# Patient Record
Sex: MALE | Race: WHITE | NOT HISPANIC OR LATINO | Employment: OTHER | URBAN - METROPOLITAN AREA
[De-identification: names, ages, dates, MRNs, and addresses within clinical notes are randomized per-mention and may not be internally consistent; named-entity substitution may affect disease eponyms.]

---

## 2017-07-12 ENCOUNTER — APPOINTMENT (OUTPATIENT)
Dept: GENERAL RADIOLOGY | Facility: CLINIC | Age: 82
End: 2017-07-12
Attending: EMERGENCY MEDICINE
Payer: COMMERCIAL

## 2017-07-12 ENCOUNTER — HOSPITAL ENCOUNTER (EMERGENCY)
Facility: CLINIC | Age: 82
Discharge: HOME | End: 2017-07-12
Attending: EMERGENCY MEDICINE | Admitting: EMERGENCY MEDICINE
Payer: COMMERCIAL

## 2017-07-12 VITALS
HEART RATE: 61 BPM | DIASTOLIC BLOOD PRESSURE: 73 MMHG | TEMPERATURE: 97.6 F | OXYGEN SATURATION: 98 % | RESPIRATION RATE: 20 BRPM | SYSTOLIC BLOOD PRESSURE: 143 MMHG

## 2017-07-12 DIAGNOSIS — I48.20 CHRONIC ATRIAL FIBRILLATION (H): ICD-10-CM

## 2017-07-12 DIAGNOSIS — R42 LIGHTHEADEDNESS: ICD-10-CM

## 2017-07-12 LAB
ANION GAP SERPL CALCULATED.3IONS-SCNC: 6 MMOL/L (ref 3–14)
BUN SERPL-MCNC: 21 MG/DL (ref 7–30)
CALCIUM SERPL-MCNC: 8.8 MG/DL (ref 8.5–10.1)
CHLORIDE SERPL-SCNC: 106 MMOL/L (ref 94–109)
CO2 SERPL-SCNC: 28 MMOL/L (ref 20–32)
CREAT SERPL-MCNC: 1.19 MG/DL (ref 0.66–1.25)
ERYTHROCYTE [DISTWIDTH] IN BLOOD BY AUTOMATED COUNT: 15.9 % (ref 10–15)
GFR SERPL CREATININE-BSD FRML MDRD: 59 ML/MIN/1.7M2
GLUCOSE SERPL-MCNC: 103 MG/DL (ref 70–99)
HCT VFR BLD AUTO: 36.7 % (ref 40–53)
HGB BLD-MCNC: 11.3 G/DL (ref 13.3–17.7)
INTERPRETATION ECG - MUSE: NORMAL
MCH RBC QN AUTO: 27.3 PG (ref 26.5–33)
MCHC RBC AUTO-ENTMCNC: 30.8 G/DL (ref 31.5–36.5)
MCV RBC AUTO: 89 FL (ref 78–100)
NT-PROBNP SERPL-MCNC: 3179 PG/ML (ref 0–1800)
PLATELET # BLD AUTO: 254 10E9/L (ref 150–450)
POTASSIUM SERPL-SCNC: 4.4 MMOL/L (ref 3.4–5.3)
RBC # BLD AUTO: 4.14 10E12/L (ref 4.4–5.9)
SODIUM SERPL-SCNC: 140 MMOL/L (ref 133–144)
TROPONIN I SERPL-MCNC: NORMAL UG/L (ref 0–0.04)
WBC # BLD AUTO: 7.2 10E9/L (ref 4–11)

## 2017-07-12 PROCEDURE — 83880 ASSAY OF NATRIURETIC PEPTIDE: CPT | Performed by: EMERGENCY MEDICINE

## 2017-07-12 PROCEDURE — 93005 ELECTROCARDIOGRAM TRACING: CPT

## 2017-07-12 PROCEDURE — 85027 COMPLETE CBC AUTOMATED: CPT | Performed by: EMERGENCY MEDICINE

## 2017-07-12 PROCEDURE — 80048 BASIC METABOLIC PNL TOTAL CA: CPT | Performed by: EMERGENCY MEDICINE

## 2017-07-12 PROCEDURE — 71020 XR CHEST 2 VW: CPT

## 2017-07-12 PROCEDURE — 84484 ASSAY OF TROPONIN QUANT: CPT | Performed by: EMERGENCY MEDICINE

## 2017-07-12 PROCEDURE — 99285 EMERGENCY DEPT VISIT HI MDM: CPT | Mod: 25

## 2017-07-12 ASSESSMENT — ENCOUNTER SYMPTOMS
SHORTNESS OF BREATH: 1
VOMITING: 0
PALPITATIONS: 1
FEVER: 0
LIGHT-HEADEDNESS: 1
COUGH: 0
DIZZINESS: 0
DIARRHEA: 0
NAUSEA: 0

## 2017-07-12 NOTE — DISCHARGE INSTRUCTIONS
Discharge Instructions for Atrial Fibrillation  You have been diagnosed with an abnormal heart rhythm called atrial fibrillation. With this condition, your heart s 2 upper chambers quiver rather than squeeze the blood out in a normal pattern. This leads to an irregular and sometimes rapid heartbeat. Some people will develop associated symptoms such as a flip-flopping heartbeat, chest pain, lightheadedness, or shortness of breath. Other people may have no symptoms at all. Atrial fibrillation is serious because it affects the heart s ability to fill with blood as it should. Blood clots may form. This increases the risk for stroke. Untreated atrial fibrillation can also lead to heart failure. Atrial fibrillation can be controlled. With treatment, most people with atrial fibrillation lead normal lives.  Treatment options  Recommended treatment for atrial fibrillation depends on your age, symptoms, how long you have had atrial fibrillation, and other factors. You will have a complete evaluation to find out if you have any abnormalities that caused your heart to go into atrial fibrillation. This might be blocked heart arteries or a thyroid problem. Your doctor will assess your particular case and discuss choices with you.  Treatment choices may include:    Treating an underlying disorder that puts you at risk for atrial fibrillation. For example, correcting an abnormal thyroid or electrolyte problem, or treating a blocked heart artery.    Restoring a normal heart rhythm with an electrical shock (cardioversion) or with an antiarrhythmic medicine (chemical cardioversion)    Using medicine to control your heart rate in atrial fibrillation.    Preventing the risk for blood clot and stroke using blood-thinning medicines. Your doctor will tell you what he or she recommends. Choices may include aspirin, clopidogrel, warfarin, dabigatran, rivaroxaban, apixaban, and edoxaban.    Doing catheter ablation or a surgical maze  procedure. These use different methods to destroy certain areas of heart tissue. This interrupts the electrical signals causing atrial fibrillation. One of these procedures may be a choice when medicines do not work, or as an alternative to long-term medicine.    Other treatment choices may be recommended for you by your doctor.  Managing risk factors for stroke and preventing heart failure are important parts of any treatment plan for atrial fibrillation.  Home care    Take your medicines exactly as directed. Don t skip doses.    Work with your doctor to find the right medicines and doses for you.    Learn to take your own pulse. Keep a record of your results. Ask your doctor which pulse rates mean that you need medical attention. Slowing your pulse is often the goal of treatment. Ask your doctor if it s OK for you to use an automatic machine to check your pulse at home. Sometimes these machines don t count the pulse correctly when you have atrial fibrillation.    Limit your intake of coffee, tea, cola, and other beverages with caffeine. Talk with your doctor about whether you should eliminate caffeine.    Avoid over-the-counter medicines that have caffeine in them.    Let your doctor know what medicines you take, including prescription and over-the-counter medicines, as well as any supplements. They interfere with some medicines given for atrial fibrillation.    Ask your doctor about whether you can drink alcohol. Some people need to avoid alcohol to better treat atrial fibrillation. If you are taking blood-thinner medicines, alcohol may interfere with them by increasing their effect.    Never take stimulants such as amphetamines or cocaine. These drugs can speed up your heart rate and trigger atrial fibrillation.  Follow-up care  Follow up with your doctor, or as advised.     When should I call my healthcare provider  Call your healthcare provider right away if you have any of the  following:    Weakness    Dizziness    Fainting    Fatigue    Shortness of breath    Chest pain with increased activity    A change in the usual regularity of your heartbeat, or an unusually fast heartbeat   Date Last Reviewed: 4/23/2016 2000-2017 [employer]. [address] [place] [address], [place] [address]. All rights reserved. This information is not intended as a substitute for professional medical care. Always follow your healthcare professional's instructions.        Possible Causes of Dizziness or Fainting    Dizziness and fainting can have many causes. Below are some examples of possible causes your healthcare provider will look to rule out.  Benign paroxysmal positional vertigo (BPPV)  BPPV results when calcium crystals inside the inner ear shift into the wrong position. BPPV causes episodes of vertigo (a spinning sensation). Episodes most often happen when the head is moved in a certain way. This is more common in people 65 and older.   Infection or inflammation  The semicircular canals of the ear may become infected or inflamed. In this case, they can send the wrong balance signals. This can cause vertigo.  Meniere disease  Meniere disease happens when there is too much fluid in the semicircular canals. This can cause vertigo. It also can cause hearing problems and buzzing or ringing in the ears (called tinnitus). You may also have a feeling of pressure or fullness in the ear.  Syncope  Syncope is fainting that happens when the brain doesn t get enough oxygen-rich blood. It can be caused by low heart rate or low blood pressure. This is called vasovagal syncope. It can also be caused by sitting or standing up too quickly. This is called orthostatic hypotension. Syncope may also be due to a heart valve problem, an abnormal heart rhythm, or other heart problems. Dizziness can also happen from stroke, hemorrhage in the brain, or other problems in the brain. Your healthcare provider may do certain  tests to rule out these conditions.  Other causes  Other causes include:    Medicines. Certain medicines can cause dizziness and even fainting. In some cases, stopping a medicine too quickly can lead to withdrawal symptoms, including dizziness and fainting.    Anxiety. Being anxious can lead to breathing changes, such as hyperventilation. These can lead to dizziness and fainting.  Additional causes for dizziness and fainting also exist. Talk to your healthcare provider for more information.     Date Last Reviewed: 10/6/2015    3337-6813 [employer]. [address] [place] [address], [place] [address]. All rights reserved. This information is not intended as a substitute for professional medical care. Always follow your healthcare professional's instructions.

## 2017-07-12 NOTE — ED AVS SNAPSHOT
[facility] Emergency Department    [address]    [facility] [address]    Phone:  [phone]    Fax:  [phone]                                       Crow Salinas   MRN: [id number]    Department:  [facility] Emergency Department   Date of Visit:  7/12/2017           After Visit Summary Signature Page     I have received my discharge instructions, and my questions have been answered. I have discussed any challenges I see with this plan with the nurse or doctor.    ..........................................................................................................................................  Patient/Patient Representative Signature      ..........................................................................................................................................  Patient Representative Print Name and Relationship to Patient    ..................................................               ................................................  Date                                            Time    ..........................................................................................................................................  Reviewed by Signature/Title    ...................................................              ..............................................  Date                                                            Time

## 2017-07-12 NOTE — ED NOTES
ABC's intact.  Alert and oriented x4.    Pt states he began to have palpiations 1-2 days ago.  Today states he feels a little bit short of breath.  Denies chest pain, back pain, or arm pain.  On eliquis for a-fib.

## 2017-07-12 NOTE — ED AVS SNAPSHOT
[facility] Emergency Department    [address] [place] [address]    Phone:  [phone]    Fax:  [phone]                                       Crow Salinas   MRN: [id number]    Department:  [facility] Emergency Department   Date of Visit:  7/12/2017           Patient Information     Date Of Birth          1935        Your diagnoses for this visit were:     Lightheadedness     Chronic atrial fibrillation (H)        You were seen by Porfirio Larsen MD.      Follow-up Information     Follow up with Cardiology.    Why:  for re-evaluation of your symptoms, next available        Discharge Instructions         Discharge Instructions for Atrial Fibrillation  You have been diagnosed with an abnormal heart rhythm called atrial fibrillation. With this condition, your heart s 2 upper chambers quiver rather than squeeze the blood out in a normal pattern. This leads to an irregular and sometimes rapid heartbeat. Some people will develop associated symptoms such as a flip-flopping heartbeat, chest pain, lightheadedness, or shortness of breath. Other people may have no symptoms at all. Atrial fibrillation is serious because it affects the heart s ability to fill with blood as it should. Blood clots may form. This increases the risk for stroke. Untreated atrial fibrillation can also lead to heart failure. Atrial fibrillation can be controlled. With treatment, most people with atrial fibrillation lead normal lives.  Treatment options  Recommended treatment for atrial fibrillation depends on your age, symptoms, how long you have had atrial fibrillation, and other factors. You will have a complete evaluation to find out if you have any abnormalities that caused your heart to go into atrial fibrillation. This might be blocked heart arteries or a thyroid problem. Your doctor will assess your particular case and discuss choices with you.  Treatment choices may  include:    Treating an underlying disorder that puts you at risk for atrial fibrillation. For example, correcting an abnormal thyroid or electrolyte problem, or treating a blocked heart artery.    Restoring a normal heart rhythm with an electrical shock (cardioversion) or with an antiarrhythmic medicine (chemical cardioversion)    Using medicine to control your heart rate in atrial fibrillation.    Preventing the risk for blood clot and stroke using blood-thinning medicines. Your doctor will tell you what he or she recommends. Choices may include aspirin, clopidogrel, warfarin, dabigatran, rivaroxaban, apixaban, and edoxaban.    Doing catheter ablation or a surgical maze procedure. These use different methods to destroy certain areas of heart tissue. This interrupts the electrical signals causing atrial fibrillation. One of these procedures may be a choice when medicines do not work, or as an alternative to long-term medicine.    Other treatment choices may be recommended for you by your doctor.  Managing risk factors for stroke and preventing heart failure are important parts of any treatment plan for atrial fibrillation.  Home care    Take your medicines exactly as directed. Don t skip doses.    Work with your doctor to find the right medicines and doses for you.    Learn to take your own pulse. Keep a record of your results. Ask your doctor which pulse rates mean that you need medical attention. Slowing your pulse is often the goal of treatment. Ask your doctor if it s OK for you to use an automatic machine to check your pulse at home. Sometimes these machines don t count the pulse correctly when you have atrial fibrillation.    Limit your intake of coffee, tea, cola, and other beverages with caffeine. Talk with your doctor about whether you should eliminate caffeine.    Avoid over-the-counter medicines that have caffeine in them.    Let your doctor know what medicines you take, including prescription and  over-the-counter medicines, as well as any supplements. They interfere with some medicines given for atrial fibrillation.    Ask your doctor about whether you can drink alcohol. Some people need to avoid alcohol to better treat atrial fibrillation. If you are taking blood-thinner medicines, alcohol may interfere with them by increasing their effect.    Never take stimulants such as amphetamines or cocaine. These drugs can speed up your heart rate and trigger atrial fibrillation.  Follow-up care  Follow up with your doctor, or as advised.     When should I call my healthcare provider  Call your healthcare provider right away if you have any of the following:    Weakness    Dizziness    Fainting    Fatigue    Shortness of breath    Chest pain with increased activity    A change in the usual regularity of your heartbeat, or an unusually fast heartbeat   Date Last Reviewed: 4/23/2016 2000-2017 [employer]. [address] [place] [address], [place] [address]. All rights reserved. This information is not intended as a substitute for professional medical care. Always follow your healthcare professional's instructions.        Possible Causes of Dizziness or Fainting    Dizziness and fainting can have many causes. Below are some examples of possible causes your healthcare provider will look to rule out.  Benign paroxysmal positional vertigo (BPPV)  BPPV results when calcium crystals inside the inner ear shift into the wrong position. BPPV causes episodes of vertigo (a spinning sensation). Episodes most often happen when the head is moved in a certain way. This is more common in people 65 and older.   Infection or inflammation  The semicircular canals of the ear may become infected or inflamed. In this case, they can send the wrong balance signals. This can cause vertigo.  Meniere disease  Meniere disease happens when there is too much fluid in the semicircular canals. This can cause vertigo. It also can cause hearing  problems and buzzing or ringing in the ears (called tinnitus). You may also have a feeling of pressure or fullness in the ear.  Syncope  Syncope is fainting that happens when the brain doesn t get enough oxygen-rich blood. It can be caused by low heart rate or low blood pressure. This is called vasovagal syncope. It can also be caused by sitting or standing up too quickly. This is called orthostatic hypotension. Syncope may also be due to a heart valve problem, an abnormal heart rhythm, or other heart problems. Dizziness can also happen from stroke, hemorrhage in the brain, or other problems in the brain. Your healthcare provider may do certain tests to rule out these conditions.  Other causes  Other causes include:    Medicines. Certain medicines can cause dizziness and even fainting. In some cases, stopping a medicine too quickly can lead to withdrawal symptoms, including dizziness and fainting.    Anxiety. Being anxious can lead to breathing changes, such as hyperventilation. These can lead to dizziness and fainting.  Additional causes for dizziness and fainting also exist. Talk to your healthcare provider for more information.     Date Last Reviewed: 10/6/2015    4909-6771 [employer]. [address] [place] [address]. All rights reserved. This information is not intended as a substitute for professional medical care. Always follow your healthcare professional's instructions.          24 Hour Appointment Hotline       To make an appointment at any [facility], call [phone] ([phone]). If you don't have a family doctor or clinic, we will help you find one. [facility] are conveniently located to serve the needs of you and your family.             Review of your medicines      Notice     You have not been prescribed any medications.            Procedures and tests performed during your visit     BNP    Basic metabolic panel (BMP)    CBC (platelets, no diff)    EKG 12  lead    Troponin I (now)    XR Chest 2 Views      Orders Needing Specimen Collection     None      Pending Results     Date and Time Order Name Status Description    7/12/2017 1429 XR Chest 2 Views Preliminary             Pending Culture Results     No orders found from 7/10/2017 to 7/13/2017.            Pending Results Instructions     If you had any lab results that were not finalized at the time of your Discharge, you can call the ED Lab Result RN at [phone]. You will be contacted by this team for any positive Lab results or changes in treatment. The nurses are available 7 days a week from 10A to 6:30P.  You can leave a message 24 hours per day and they will return your call.        Test Results From Your Hospital Stay        7/12/2017  3:23 PM      Component Results     Component Value Ref Range & Units Status    Troponin I ES  0.000 - 0.045 ug/L Final    <0.015  The 99th percentile for upper reference range is 0.045 ug/L.  Troponin values in   the range of 0.045 - 0.120 ug/L may be associated with risks of adverse   clinical events.           7/12/2017  3:23 PM      Component Results     Component Value Ref Range & Units Status    Sodium 140 133 - 144 mmol/L Final    Potassium 4.4 3.4 - 5.3 mmol/L Final    Chloride 106 94 - 109 mmol/L Final    Carbon Dioxide 28 20 - 32 mmol/L Final    Anion Gap 6 3 - 14 mmol/L Final    Glucose 103 (H) 70 - 99 mg/dL Final    Urea Nitrogen 21 7 - 30 mg/dL Final    Creatinine 1.19 0.66 - 1.25 mg/dL Final    GFR Estimate 59 (L) >60 mL/min/1.7m2 Final    Non  GFR Calc    GFR Estimate If Black 71 >60 mL/min/1.7m2 Final    African American GFR Calc    Calcium 8.8 8.5 - 10.1 mg/dL Final         7/12/2017  3:02 PM      Component Results     Component Value Ref Range & Units Status    WBC 7.2 4.0 - 11.0 10e9/L Final    RBC Count 4.14 (L) 4.4 - 5.9 10e12/L Final    Hemoglobin 11.3 (L) 13.3 - 17.7 g/dL Final    Hematocrit 36.7 (L) 40.0 - 53.0 % Final    MCV 89 78 - 100  fl Final    MCH 27.3 26.5 - 33.0 pg Final    MCHC 30.8 (L) 31.5 - 36.5 g/dL Final    RDW 15.9 (H) 10.0 - 15.0 % Final    Platelet Count 254 150 - 450 10e9/L Final         7/12/2017  3:23 PM      Component Results     Component Value Ref Range & Units Status    N-Terminal Pro BNP Inpatient 3179 (H) 0 - 1800 pg/mL Final    Reference range shown and results flagged as abnormal are suggested inpatient   cut points for confirming diagnosis if CHF in an acute setting. Establishing   a   baseline value for each individual patient is useful for follow-up. An   inpatient or emergency department NT-proPBNP <300 pg/mL effectively rules out   acute CHF, with 99% negative predictive value.  The outpatient non-acute reference range for ruling out CHF is:   0-125 pg/mL (age 18 to less than 75)   0-450 pg/mL (age 75 yrs and older)           7/12/2017  5:06 PM      Narrative     CHEST TWO VIEW   7/12/2017 5:02 PM     HISTORY: Dyspnea.    COMPARISON: None.        Impression     IMPRESSION: Cardiac silhouette within normal limits. No pleural  effusion. No pneumothorax identified. No focal airspace opacities.  Degenerative changes in the spine. Degenerative changes of the right  glenohumeral joint.                Clinical Quality Measure: Blood Pressure Screening     Your blood pressure was checked while you were in the emergency department today. The last reading we obtained was  BP: (!) 143/93 . Please read the guidelines below about what these numbers mean and what you should do about them.  If your systolic blood pressure (the top number) is less than 120 and your diastolic blood pressure (the bottom number) is less than 80, then your blood pressure is normal. There is nothing more that you need to do about it.  If your systolic blood pressure (the top number) is 120-139 or your diastolic blood pressure (the bottom number) is 80-89, your blood pressure may be higher than it should be. You should have your blood pressure rechecked  "within a year by a primary care provider.  If your systolic blood pressure (the top number) is 140 or greater or your diastolic blood pressure (the bottom number) is 90 or greater, you may have high blood pressure. High blood pressure is treatable, but if left untreated over time it can put you at risk for heart attack, stroke, or kidney failure. You should have your blood pressure rechecked by a primary care provider within the next 4 weeks.  If your provider in the emergency department today gave you specific instructions to follow-up with your doctor or provider even sooner than that, you should follow that instruction and not wait for up to 4 weeks for your follow-up visit.        Thank you for choosing [facility]       Thank you for choosing [facility] for your care. Our goal is always to provide you with excellent care. Hearing back from our patients is one way we can continue to improve our services. Please take a few minutes to complete the written survey that you may receive in the mail after you visit with us. Thank you!        [employer] Information     [employer] lets you send messages to your doctor, view your test results, renew your prescriptions, schedule appointments and more. To sign up, go to [email or web address] . Click on \"Log in\" on the left side of the screen, which will take you to the Welcome page. Then click on \"Sign up Now\" on the right side of the page.     You will be asked to enter the access code listed below, as well as some personal information. Please follow the directions to create your username and password.     Your access code is: [email or web address]  Expires: 10/10/2017  5:24 PM     Your access code will  in 90 days. If you need help or a new code, please call your [facility] clinic or [phone].        Care EveryWhere ID     This is your Care EveryWhere ID. This could be used by other organizations to access your [facility] medical records  [id number]        Equal Access to Services  "    ASHKAN CROCKER : Hadii chadd Bryant, wajarrettda luqadaha, qaybta kaalmanatalie ramey, nic alcazar. So [facility] [phone].    ATENCIÓN: Si habla español, tiene a valentine disposición servicios gratuitos de asistencia lingüística. Llame al [phone].    We comply with applicable federal civil rights laws and [place] laws. We do not discriminate on the basis of race, color, national origin, age, disability sex, sexual orientation or gender identity.            After Visit Summary       This is your record. Keep this with you and show to your community pharmacist(s) and doctor(s) at your next visit.

## 2017-07-12 NOTE — ED PROVIDER NOTES
History     Chief Complaint:  Shortness of breath and palpations    HPI   Crow Salinas is a 81 year old male, with a history of atrial fibrillation and taking Eliquis, who presents intermittent lightheadedness and heart palpations. He has chronic atrial fibrillation and has been compliant with his medical regimen, which includes eliquis and lopressor.  He has not checked his pulse, but does not believe it to have been over 100 bpm, and he denies experiencing any chest pain, leg swelling, orthopnea, or PND.     Allergies:  No Known Drug Allergies     Medications:    Eliquis  Lopressor    Past Medical History:    Atrial fibrillation    Past Surgical History:    History reviewed. No pertinent past surgical history.     Family History:    The patient denies any relevant family medical history.     Social History:  Marital Status:   [2]    Review of Systems   Constitutional: Negative for fever.   Respiratory: Positive for shortness of breath. Negative for cough.    Cardiovascular: Positive for palpitations. Negative for chest pain and leg swelling.   Gastrointestinal: Negative for diarrhea, nausea and vomiting.   Neurological: Positive for light-headedness. Negative for dizziness.   All other systems reviewed and are negative.    Physical Exam   Vitals:  Patient Vitals for the past 24 hrs:   BP Temp Temp src Pulse Heart Rate Resp SpO2   07/12/17 1725 143/73 - - - - - -   07/12/17 1715 - - - - - - 98 %   07/12/17 1710 151/78 - - - - - 99 %   07/12/17 1510 (!) 143/93 - - - 66 - 97 %   07/12/17 1505 - - - - 67 - 100 %   07/12/17 1500 - - - - 69 - 92 %   07/12/17 1455 127/83 - - - 69 - 100 %   07/12/17 1450 - - - - 59 - 100 %   07/12/17 1445 - - - - 71 - 100 %   07/12/17 1440 134/70 - - - 75 - 93 %   07/12/17 1435 - - - - 70 - 100 %   07/12/17 1430 - - - - 67 - 98 %   07/12/17 1425 131/80 - - - 61 - 100 %   07/12/17 1323 152/89 97.6  F (36.4  C) Oral 61 - 20 98 %      Physical Exam  General: Patient is alert and  cooperative.  HENT: No nasal congestion or drainage.   Eyes: EOMI, PERRLA.  Normal conjunctiva.  Neck: Normal range of motion. Neck supple.  Cardiovascular: Normal rate, irregular rhythm and normal heart sounds.   Pulmonary/Chest: Effort normal.  No wheezing or crackles.  Abdominal: Soft. Patient exhibits no distension and no mass. There is no tenderness.     Musculoskeletal: Normal range of motion. No lower leg edema or calf asymmetry.   Neurological: Patient  is alert and oriented. Coordination, strength, sensation normal.   Skin: Skin is warm and dry. No rash noted.   Psychiatric: Patient has a normal mood and affect. Normal behavior and judgement.    Emergency Department Course   ECG:  ECG taken at 1319, ECG read at 1405  Atrial fibrillation. Abnormal ECG  Rate 73 bpm. GA interval *. QRS duration 86. QT/QTc 348/438. P-R-T axes *, 42, 46.     Imaging:  Radiology findings were communicated with the patient who voiced understanding of the findings.  Chest XR 2 views  IMPRESSION: Cardiac silhouette within normal limits. No pleural  effusion. No pneumothorax identified. No focal airspace opacities.  Degenerative changes in the spine. Degenerative changes of the right  glenohumeral joint.  Reading per radiology.     Laboratory:  Laboratory findings were communicated with the patient who voiced understanding of the findings.  Troponin (Collected 1420): < 0.015  BMP: Glucose: 103(H), GFR: 59(L), o/w WNL (Creatinine 1.19)    CBC: RBC: 4.14(L), HGB: 11.3(L), HCT: 36.7(L), CHC: 30.8, RDW: 15.9(H), o/w WNL (WBC 7.2,)   BNP: 3179(H)    Emergency Department Course:  Nursing notes and vitals reviewed.  I performed an exam of the patient as documented above.   IV was inserted and blood was drawn for laboratory testing, results above.   The patient was sent for a XR while in the emergency department, results above.      1621 I rechecked with the patient    I discussed the treatment plan with the patient. They expressed  understanding of this plan and consented to discharge. They will be discharged home with instructions for care and follow up. In addition, the patient will return to the emergency department if their symptoms persist, worsen, if new symptoms arise or if there is any concern.  All questions were answered.     I personally reviewed the laboratory results with the Patient and answered all related questions prior to discharge.    Impression & Plan      Medical Decision Making:  Crow Salinas is a 81 year old male who presents to the emergency department today with intermittent light headedness and palpitations. He has a history of chronic atrial fibrillation for which he is anticoagulated on eliquis. He has had no associated chest pain or significant dyspnea. On arrival here he is very slightly hypertensive but has a normal heart rate, an unremarkable physical examination. Testing has included an electrocardiogram depicting a rate controled atrial fibrillation. He has an undetectable troponin, normal renal function, a hemoglobin of 11.3. His BNP is 3179 but he has a chest x ray which is unremarkable. There is no radiographic evidence of congestive heart failure. His ED stay was prolonged due to extraordinarily high volumes causing [facility] to go on divert this afternoon. Throughout his stay he has had oxygen saturation of 98 to 100 %. These findings were discussed with Crow. Etiology for his light headedness is uncertain but it appears though as his heart rate is adequately controlled and there is no concern for an ischemic process. His elevated BNP was discussed with him but given his normal oxygen saturations and X ray I don't believe any emergency intervention is neccessary at this time. I did talk with him about the possibility of benefiting from diuretics but suggested that he contact his clinic and or cardiologist for follow up next available for reevaluation and to discs's adding this to his medicinal care.  Diagnosis is light headedness, chronic atrial fibrillation, rate control, slightly elevated BNP with a normal physical exam.    Diagnosis:    ICD-10-CM    1. Lightheadedness R42    2. Chronic atrial fibrillation (H) I48.2         Disposition:   Discharged    Scribe Disclosure:  PRADEEP, Robert Wright, am serving as a scribe at 3:03 PM on 7/12/2017 to document services personally performed by Porfirio Larsen MD, based on my observations and the provider's statements to me.   [facility] EMERGENCY DEPARTMENT       Porfirio Larsen MD  07/13/17 1339

## 2019-04-04 ENCOUNTER — TRANSFERRED RECORDS (OUTPATIENT)
Dept: HEALTH INFORMATION MANAGEMENT | Facility: CLINIC | Age: 84
End: 2019-04-04

## 2019-04-15 ENCOUNTER — HOSPITAL ENCOUNTER (OUTPATIENT)
Dept: CARDIAC REHAB | Facility: CLINIC | Age: 84
End: 2019-04-15
Attending: INTERNAL MEDICINE
Payer: COMMERCIAL

## 2019-04-15 PROCEDURE — G0424 PULMONARY REHAB W EXER: HCPCS

## 2019-04-15 PROCEDURE — 40000244 ZZH STATISTIC VISIT PULM REHAB

## 2019-04-29 ENCOUNTER — HOSPITAL ENCOUNTER (OUTPATIENT)
Dept: CARDIAC REHAB | Facility: CLINIC | Age: 84
End: 2019-04-29
Attending: INTERNAL MEDICINE
Payer: COMMERCIAL

## 2019-04-29 PROCEDURE — 93798 PHYS/QHP OP CAR RHAB W/ECG: CPT

## 2019-04-29 PROCEDURE — 40000116 ZZH STATISTIC OP CR VISIT

## 2019-05-02 ENCOUNTER — HOSPITAL ENCOUNTER (OUTPATIENT)
Dept: CARDIAC REHAB | Facility: CLINIC | Age: 84
End: 2019-05-02
Attending: INTERNAL MEDICINE
Payer: COMMERCIAL

## 2019-05-02 PROCEDURE — G0424 PULMONARY REHAB W EXER: HCPCS

## 2019-05-02 PROCEDURE — 40000244 ZZH STATISTIC VISIT PULM REHAB

## 2019-05-07 ENCOUNTER — HOSPITAL ENCOUNTER (OUTPATIENT)
Dept: CARDIAC REHAB | Facility: CLINIC | Age: 84
End: 2019-05-07
Attending: INTERNAL MEDICINE
Payer: COMMERCIAL

## 2019-05-07 PROCEDURE — G0424 PULMONARY REHAB W EXER: HCPCS

## 2019-05-07 PROCEDURE — 40000244 ZZH STATISTIC VISIT PULM REHAB

## 2019-05-09 ENCOUNTER — HOSPITAL ENCOUNTER (OUTPATIENT)
Dept: CARDIAC REHAB | Facility: CLINIC | Age: 84
End: 2019-05-09
Attending: INTERNAL MEDICINE
Payer: COMMERCIAL

## 2019-05-09 PROCEDURE — 40000244 ZZH STATISTIC VISIT PULM REHAB

## 2019-05-09 PROCEDURE — G0424 PULMONARY REHAB W EXER: HCPCS

## 2019-05-14 ENCOUNTER — HOSPITAL ENCOUNTER (OUTPATIENT)
Dept: CARDIAC REHAB | Facility: CLINIC | Age: 84
End: 2019-05-14
Attending: INTERNAL MEDICINE
Payer: COMMERCIAL

## 2019-05-14 PROCEDURE — G0424 PULMONARY REHAB W EXER: HCPCS

## 2019-05-14 PROCEDURE — 40000244 ZZH STATISTIC VISIT PULM REHAB

## 2019-05-16 ENCOUNTER — HOSPITAL ENCOUNTER (OUTPATIENT)
Dept: CARDIAC REHAB | Facility: CLINIC | Age: 84
End: 2019-05-16
Attending: INTERNAL MEDICINE
Payer: COMMERCIAL

## 2019-05-16 PROCEDURE — 40000244 ZZH STATISTIC VISIT PULM REHAB

## 2019-05-16 PROCEDURE — G0424 PULMONARY REHAB W EXER: HCPCS

## 2019-05-21 ENCOUNTER — HOSPITAL ENCOUNTER (OUTPATIENT)
Dept: CARDIAC REHAB | Facility: CLINIC | Age: 84
End: 2019-05-21
Attending: INTERNAL MEDICINE
Payer: COMMERCIAL

## 2019-05-21 PROCEDURE — G0424 PULMONARY REHAB W EXER: HCPCS

## 2019-05-21 PROCEDURE — 40000244 ZZH STATISTIC VISIT PULM REHAB

## 2019-05-23 ENCOUNTER — HOSPITAL ENCOUNTER (OUTPATIENT)
Dept: CARDIAC REHAB | Facility: CLINIC | Age: 84
End: 2019-05-23
Attending: INTERNAL MEDICINE
Payer: COMMERCIAL

## 2019-05-23 PROCEDURE — 40000244 ZZH STATISTIC VISIT PULM REHAB

## 2019-05-23 PROCEDURE — G0424 PULMONARY REHAB W EXER: HCPCS

## 2019-05-28 ENCOUNTER — HOSPITAL ENCOUNTER (OUTPATIENT)
Dept: CARDIAC REHAB | Facility: CLINIC | Age: 84
End: 2019-05-28
Attending: INTERNAL MEDICINE
Payer: COMMERCIAL

## 2019-05-28 PROCEDURE — G0424 PULMONARY REHAB W EXER: HCPCS

## 2019-05-28 PROCEDURE — 40000244 ZZH STATISTIC VISIT PULM REHAB

## 2019-06-18 ENCOUNTER — HOSPITAL ENCOUNTER (OUTPATIENT)
Dept: CARDIAC REHAB | Facility: CLINIC | Age: 84
End: 2019-06-18
Attending: INTERNAL MEDICINE
Payer: COMMERCIAL

## 2019-06-18 PROCEDURE — G0424 PULMONARY REHAB W EXER: HCPCS

## 2019-06-18 PROCEDURE — 40000244 ZZH STATISTIC VISIT PULM REHAB

## 2019-06-20 ENCOUNTER — HOSPITAL ENCOUNTER (OUTPATIENT)
Dept: CARDIAC REHAB | Facility: CLINIC | Age: 84
End: 2019-06-20
Attending: INTERNAL MEDICINE
Payer: COMMERCIAL

## 2019-06-20 PROCEDURE — G0424 PULMONARY REHAB W EXER: HCPCS

## 2019-06-20 PROCEDURE — 40000244 ZZH STATISTIC VISIT PULM REHAB

## 2019-06-25 ENCOUNTER — HOSPITAL ENCOUNTER (OUTPATIENT)
Dept: CARDIAC REHAB | Facility: CLINIC | Age: 84
End: 2019-06-25
Attending: INTERNAL MEDICINE
Payer: COMMERCIAL

## 2019-06-25 PROCEDURE — G0424 PULMONARY REHAB W EXER: HCPCS

## 2019-06-25 PROCEDURE — 40000244 ZZH STATISTIC VISIT PULM REHAB

## 2019-06-27 ENCOUNTER — HOSPITAL ENCOUNTER (OUTPATIENT)
Dept: CARDIAC REHAB | Facility: CLINIC | Age: 84
End: 2019-06-27
Attending: INTERNAL MEDICINE
Payer: COMMERCIAL

## 2019-06-27 PROCEDURE — G0424 PULMONARY REHAB W EXER: HCPCS

## 2019-06-27 PROCEDURE — 40000244 ZZH STATISTIC VISIT PULM REHAB

## 2019-07-02 ENCOUNTER — HOSPITAL ENCOUNTER (OUTPATIENT)
Dept: CARDIAC REHAB | Facility: CLINIC | Age: 84
End: 2019-07-02
Attending: INTERNAL MEDICINE
Payer: COMMERCIAL

## 2019-07-02 PROCEDURE — 40000244 ZZH STATISTIC VISIT PULM REHAB

## 2019-07-02 PROCEDURE — G0239 OTH RESP PROC, GROUP: HCPCS

## 2019-07-09 ENCOUNTER — HOSPITAL ENCOUNTER (OUTPATIENT)
Dept: CARDIAC REHAB | Facility: CLINIC | Age: 84
End: 2019-07-09
Attending: INTERNAL MEDICINE
Payer: COMMERCIAL

## 2019-07-09 PROCEDURE — 40000244 ZZH STATISTIC VISIT PULM REHAB

## 2019-07-09 PROCEDURE — G0424 PULMONARY REHAB W EXER: HCPCS

## 2019-07-11 ENCOUNTER — HOSPITAL ENCOUNTER (OUTPATIENT)
Dept: CARDIAC REHAB | Facility: CLINIC | Age: 84
End: 2019-07-11
Attending: INTERNAL MEDICINE
Payer: COMMERCIAL

## 2019-07-11 PROCEDURE — G0424 PULMONARY REHAB W EXER: HCPCS

## 2019-07-11 PROCEDURE — 40000244 ZZH STATISTIC VISIT PULM REHAB

## 2019-07-18 ENCOUNTER — HOSPITAL ENCOUNTER (OUTPATIENT)
Dept: CARDIAC REHAB | Facility: CLINIC | Age: 84
End: 2019-07-18
Attending: INTERNAL MEDICINE
Payer: COMMERCIAL

## 2019-07-18 PROCEDURE — 40000244 ZZH STATISTIC VISIT PULM REHAB

## 2019-07-18 PROCEDURE — G0424 PULMONARY REHAB W EXER: HCPCS

## 2019-07-23 ENCOUNTER — HOSPITAL ENCOUNTER (OUTPATIENT)
Dept: CARDIAC REHAB | Facility: CLINIC | Age: 84
End: 2019-07-23
Attending: INTERNAL MEDICINE
Payer: COMMERCIAL

## 2019-07-23 PROCEDURE — 40000244 ZZH STATISTIC VISIT PULM REHAB

## 2019-07-23 PROCEDURE — G0424 PULMONARY REHAB W EXER: HCPCS

## 2019-07-25 ENCOUNTER — HOSPITAL ENCOUNTER (OUTPATIENT)
Dept: CARDIAC REHAB | Facility: CLINIC | Age: 84
End: 2019-07-25
Attending: INTERNAL MEDICINE
Payer: COMMERCIAL

## 2019-07-25 PROCEDURE — G0424 PULMONARY REHAB W EXER: HCPCS

## 2019-07-25 PROCEDURE — 40000244 ZZH STATISTIC VISIT PULM REHAB

## 2022-06-16 ENCOUNTER — APPOINTMENT (OUTPATIENT)
Dept: GENERAL RADIOLOGY | Facility: CLINIC | Age: 87
End: 2022-06-16
Attending: EMERGENCY MEDICINE
Payer: COMMERCIAL

## 2022-06-16 ENCOUNTER — HOSPITAL ENCOUNTER (EMERGENCY)
Facility: CLINIC | Age: 87
Discharge: HOME | End: 2022-06-16
Attending: EMERGENCY MEDICINE | Admitting: EMERGENCY MEDICINE
Payer: COMMERCIAL

## 2022-06-16 VITALS
OXYGEN SATURATION: 93 % | DIASTOLIC BLOOD PRESSURE: 87 MMHG | HEART RATE: 62 BPM | HEIGHT: 71 IN | TEMPERATURE: 97.1 F | RESPIRATION RATE: 18 BRPM | SYSTOLIC BLOOD PRESSURE: 145 MMHG | BODY MASS INDEX: 25.9 KG/M2 | WEIGHT: 185 LBS

## 2022-06-16 DIAGNOSIS — J44.1 COPD EXACERBATION (H): ICD-10-CM

## 2022-06-16 DIAGNOSIS — R06.02 SHORTNESS OF BREATH: ICD-10-CM

## 2022-06-16 LAB
ANION GAP SERPL CALCULATED.3IONS-SCNC: 9 MMOL/L (ref 3–14)
ATRIAL RATE - MUSE: 53 BPM
BASOPHILS # BLD AUTO: 0 10E3/UL (ref 0–0.2)
BASOPHILS NFR BLD AUTO: 0 %
BUN SERPL-MCNC: 19 MG/DL (ref 7–30)
CALCIUM SERPL-MCNC: 9.1 MG/DL (ref 8.5–10.1)
CHLORIDE BLD-SCNC: 107 MMOL/L (ref 94–109)
CO2 SERPL-SCNC: 26 MMOL/L (ref 20–32)
CREAT SERPL-MCNC: 1.01 MG/DL (ref 0.66–1.25)
DIASTOLIC BLOOD PRESSURE - MUSE: NORMAL MMHG
EOSINOPHIL # BLD AUTO: 0.1 10E3/UL (ref 0–0.7)
EOSINOPHIL NFR BLD AUTO: 2 %
ERYTHROCYTE [DISTWIDTH] IN BLOOD BY AUTOMATED COUNT: 16.1 % (ref 10–15)
FLUAV RNA SPEC QL NAA+PROBE: NEGATIVE
FLUBV RNA RESP QL NAA+PROBE: NEGATIVE
GFR SERPL CREATININE-BSD FRML MDRD: 72 ML/MIN/1.73M2
GLUCOSE BLD-MCNC: 154 MG/DL (ref 70–99)
HCT VFR BLD AUTO: 44 % (ref 40–53)
HGB BLD-MCNC: 14 G/DL (ref 13.3–17.7)
HOLD SPECIMEN: NORMAL
IMM GRANULOCYTES # BLD: 0 10E3/UL
IMM GRANULOCYTES NFR BLD: 0 %
INTERPRETATION ECG - MUSE: NORMAL
LYMPHOCYTES # BLD AUTO: 1.2 10E3/UL (ref 0.8–5.3)
LYMPHOCYTES NFR BLD AUTO: 17 %
MCH RBC QN AUTO: 30.5 PG (ref 26.5–33)
MCHC RBC AUTO-ENTMCNC: 31.8 G/DL (ref 31.5–36.5)
MCV RBC AUTO: 96 FL (ref 78–100)
MONOCYTES # BLD AUTO: 1.1 10E3/UL (ref 0–1.3)
MONOCYTES NFR BLD AUTO: 16 %
NEUTROPHILS # BLD AUTO: 4.3 10E3/UL (ref 1.6–8.3)
NEUTROPHILS NFR BLD AUTO: 65 %
NRBC # BLD AUTO: 0 10E3/UL
NRBC BLD AUTO-RTO: 0 /100
NT-PROBNP SERPL-MCNC: 2233 PG/ML (ref 0–1800)
P AXIS - MUSE: NORMAL DEGREES
PLATELET # BLD AUTO: 169 10E3/UL (ref 150–450)
POTASSIUM BLD-SCNC: 4.3 MMOL/L (ref 3.4–5.3)
PR INTERVAL - MUSE: NORMAL MS
QRS DURATION - MUSE: 84 MS
QT - MUSE: 420 MS
QTC - MUSE: 415 MS
R AXIS - MUSE: 43 DEGREES
RBC # BLD AUTO: 4.59 10E6/UL (ref 4.4–5.9)
RSV RNA SPEC NAA+PROBE: NEGATIVE
SARS-COV-2 RNA RESP QL NAA+PROBE: NEGATIVE
SODIUM SERPL-SCNC: 142 MMOL/L (ref 133–144)
SYSTOLIC BLOOD PRESSURE - MUSE: NORMAL MMHG
T AXIS - MUSE: 61 DEGREES
TROPONIN I SERPL HS-MCNC: 32 NG/L
VENTRICULAR RATE- MUSE: 59 BPM
WBC # BLD AUTO: 6.7 10E3/UL (ref 4–11)

## 2022-06-16 PROCEDURE — 80048 BASIC METABOLIC PNL TOTAL CA: CPT | Performed by: EMERGENCY MEDICINE

## 2022-06-16 PROCEDURE — C9803 HOPD COVID-19 SPEC COLLECT: HCPCS

## 2022-06-16 PROCEDURE — 83880 ASSAY OF NATRIURETIC PEPTIDE: CPT | Performed by: EMERGENCY MEDICINE

## 2022-06-16 PROCEDURE — 99285 EMERGENCY DEPT VISIT HI MDM: CPT | Mod: CS,25

## 2022-06-16 PROCEDURE — 85025 COMPLETE CBC W/AUTO DIFF WBC: CPT | Performed by: EMERGENCY MEDICINE

## 2022-06-16 PROCEDURE — 87637 SARSCOV2&INF A&B&RSV AMP PRB: CPT | Performed by: EMERGENCY MEDICINE

## 2022-06-16 PROCEDURE — 84484 ASSAY OF TROPONIN QUANT: CPT | Performed by: EMERGENCY MEDICINE

## 2022-06-16 PROCEDURE — 93005 ELECTROCARDIOGRAM TRACING: CPT | Mod: RTG

## 2022-06-16 PROCEDURE — 250N000009 HC RX 250: Performed by: EMERGENCY MEDICINE

## 2022-06-16 PROCEDURE — 36415 COLL VENOUS BLD VENIPUNCTURE: CPT | Performed by: EMERGENCY MEDICINE

## 2022-06-16 PROCEDURE — 71046 X-RAY EXAM CHEST 2 VIEWS: CPT

## 2022-06-16 PROCEDURE — 94640 AIRWAY INHALATION TREATMENT: CPT

## 2022-06-16 RX ORDER — IPRATROPIUM BROMIDE AND ALBUTEROL SULFATE 2.5; .5 MG/3ML; MG/3ML
3 SOLUTION RESPIRATORY (INHALATION) ONCE
Status: COMPLETED | OUTPATIENT
Start: 2022-06-16 | End: 2022-06-16

## 2022-06-16 RX ADMIN — IPRATROPIUM BROMIDE AND ALBUTEROL SULFATE 3 ML: 2.5; .5 SOLUTION RESPIRATORY (INHALATION) at 12:05

## 2022-06-16 ASSESSMENT — ENCOUNTER SYMPTOMS
DIARRHEA: 0
FEVER: 0
RHINORRHEA: 1
ABDOMINAL PAIN: 1
VOMITING: 0
COUGH: 1
SHORTNESS OF BREATH: 1

## 2022-06-16 NOTE — ED PROVIDER NOTES
History   Chief Complaint:  Shortness of Breath       The history is provided by the patient.      Crow Salinas is a 86 year old male on Eliquis with history of COPD, Atrial fibrillation who presents with shortness of breath. The patient states that the symptoms began a week ago and he contacted his doctor telling them he might have covid and they directed him to the ED. He states that the dyspnea diminishes with exertion and is most present when he is laying down. He affirms a cough and rhinorrhea. He denies chest pain, hemoptysis, leg swelling, vomiting, diarrhea, or fever. He mentions abdominal pain that has been present for years and unchanged today.  He did have a COVID exposure 10 days ago, but patient is fully vaccinated and has had both boosters.      Review of Systems   Constitutional: Negative for fever.   HENT: Positive for rhinorrhea.    Respiratory: Positive for cough and shortness of breath.    Cardiovascular: Negative for leg swelling.   Gastrointestinal: Positive for abdominal pain. Negative for diarrhea and vomiting.   All other systems reviewed and are negative.        Allergies:  Aspirin  Etodolac    Medications:  Eliquis  Lipitor   Percocet   Glucotorol  Lexapro  Lopressor   Miralax   Prilosec   Cozaar   Flexeril   Norvasc       Past Medical History:     Depression   Small bowl arteriovenous malformation   Abdominal aortic aneurysm without rupture   Type 2 diabetes   Chronic diastolic heart failure  Moderate pulmonary arterial systolic hypertension   BPH  COPD  Iron deficiency with anemia  Lumbar disc disorder with myelopathy   Chronic pain disorder  Chronic narcotic use  GERD  CKD  Erectile dysfunction   Hyperlipidemia   Hypertension   Chronic atrial fibrillation   Long term use of anticoagulants  Thrombocytopenia    Sepsis with hypotension   Sepsis with UTI  MITZI  chronic low back pain with sciatica   History of tobacco use  Primary osteoarthritis of right hip  Hypercholesterolemia   Bladder  "cancer    Past Surgical History:    Total hip replacement   Upper gastrointestinal endoscopy     Family History:    Mother: bladder cancer     Social History:  Patient came from home.  Patient is unaccompanied in the ED.  Patient affirms tobacco use.    Physical Exam     Patient Vitals for the past 24 hrs:   BP Temp Temp src Pulse Resp SpO2 Height Weight   06/16/22 1330 (!) 145/87 -- -- 62 -- 93 % -- --   06/16/22 1315 (!) 152/121 -- -- 70 -- 94 % -- --   06/16/22 1300 (!) 146/85 -- -- 57 -- 96 % -- --   06/16/22 1230 -- -- -- 58 -- 93 % -- --   06/16/22 1215 -- -- -- 53 -- 92 % -- --   06/16/22 1200 -- -- -- 65 -- 96 % -- --   06/16/22 1129 (!) 158/85 97.1  F (36.2  C) Temporal 60 18 96 % 1.803 m (5' 11\") 83.9 kg (185 lb)       Physical Exam  Vitals and nursing note reviewed.   Constitutional:       General: He is not in acute distress.     Appearance: Normal appearance. He is well-developed. He is not ill-appearing.   HENT:      Head: Normocephalic and atraumatic.      Right Ear: External ear normal.      Left Ear: External ear normal.      Nose: Nose normal.   Eyes:      Conjunctiva/sclera: Conjunctivae normal.   Cardiovascular:      Rate and Rhythm: Bradycardia present. Rhythm irregularly irregular.      Heart sounds: No murmur heard.  Pulmonary:      Effort: Pulmonary effort is normal. No respiratory distress.      Breath sounds: Decreased breath sounds present. No wheezing, rhonchi or rales.   Abdominal:      General: Abdomen is flat. There is no distension.      Palpations: Abdomen is soft.      Tenderness: There is no abdominal tenderness. There is no guarding or rebound.   Musculoskeletal:         General: No swelling or deformity.      Cervical back: Normal range of motion and neck supple.      Right lower leg: No edema.      Left lower leg: No edema.   Skin:     General: Skin is warm and dry.      Findings: No rash.   Neurological:      Mental Status: He is alert and oriented to person, place, and time. "   Psychiatric:         Behavior: Behavior normal.         Emergency Department Course   ECG  ECG results from 06/16/22   EKG 12-lead, tracing only     Value    Systolic Blood Pressure     Diastolic Blood Pressure     Ventricular Rate 59    Atrial Rate 53    MD Interval     QRS Duration 84        QTc 415    P Axis     R AXIS 43    T Axis 61    Interpretation ECG      Atrial fibrillation with slow ventricular response  Abnormal ECG  When compared with ECG of 12-JUL-2017 13:19,  No significant change was found  Confirmed by - EMERGENCY ROOM, PHYSICIAN ([id number]),  JYOTHI LIRIANO ([id number]) on 6/16/2022 12:24:40 PM         Imaging:  XR Chest 2 Views   Final Result   IMPRESSION: Stable cardiomediastinal silhouette. No focal airspace   disease, pleural effusion or pneumothorax. No acute bony abnormality.      TAMIKA BOSE MD            SYSTEM ID:  [id number]        Report per radiology    Laboratory:  Labs Ordered and Resulted from Time of ED Arrival to Time of ED Departure   BASIC METABOLIC PANEL - Abnormal       Result Value    Sodium 142      Potassium 4.3      Chloride 107      Carbon Dioxide (CO2) 26      Anion Gap 9      Urea Nitrogen 19      Creatinine 1.01      Calcium 9.1      Glucose 154 (*)     GFR Estimate 72     NT PROBNP INPATIENT - Abnormal    N terminal Pro BNP Inpatient 2,233 (*)    CBC WITH PLATELETS AND DIFFERENTIAL - Abnormal    WBC Count 6.7      RBC Count 4.59      Hemoglobin 14.0      Hematocrit 44.0      MCV 96      MCH 30.5      MCHC 31.8      RDW 16.1 (*)     Platelet Count 169      % Neutrophils 65      % Lymphocytes 17      % Monocytes 16      % Eosinophils 2      % Basophils 0      % Immature Granulocytes 0      NRBCs per 100 WBC 0      Absolute Neutrophils 4.3      Absolute Lymphocytes 1.2      Absolute Monocytes 1.1      Absolute Eosinophils 0.1      Absolute Basophils 0.0      Absolute Immature Granulocytes 0.0      Absolute NRBCs 0.0     TROPONIN I - Normal    Troponin I High  Sensitivity 32     INFLUENZA A/B & SARS-COV2 PCR MULTIPLEX - Normal    Influenza A PCR Negative      Influenza B PCR Negative      RSV PCR Negative      SARS CoV2 PCR Negative          Emergency Department Course:       Reviewed:  I reviewed nursing notes, vitals, past medical history and Care Everywhere    Assessments:  1138 I obtained history and examined the patient as noted above.   1357 I rechecked the patient and explained findings.       Interventions:  1205 Duoneb 3 mL Nebulization     Disposition:  The patient was discharged to home.     Impression & Plan     CMS Diagnoses: None    Medical Decision Makin yo M with SOB for past week.  Suspect COPD exacerbation.  There is no signs of fluid overload or decompensated heart failure.  No fever or significant cough to suggest pneumonia.  His COVID is negative.  His troponin is negative and EKG nonischemic despite awake his symptoms.  Also her symptoms are nonexertional so have low suspicion for cardiac etiology.  Doubt PE given that he is already anticoagulated on Eliquis.  His vital signs are normal he is in no distress.  Recommend close follow-up with his primary care doctor and we discussed return precautions.      Diagnosis:    ICD-10-CM    1. Shortness of breath  R06.02    2. COPD exacerbation (H)  J44.1        Discharge Medications:  There are no discharge medications for this patient.      Scribe Disclosure:  I, Chas Osorio, am serving as a scribe at 11:32 AM on 2022 to document services personally performed by Zeus Valladares MD based on my observations and the provider's statements to me.            Zeus Valladares MD  22 1632

## 2022-06-16 NOTE — ED TRIAGE NOTES
1 week of SOB. Slight cough. Hx COPD. ABCs intact. A&OX4. States walking makes SOB better.      Triage Assessment     Row Name 06/16/22 1128       Triage Assessment (Adult)    Airway WDL WDL       Respiratory WDL    Respiratory WDL X;rhythm/pattern    Rhythm/Pattern, Respiratory shortness of breath       Skin Circulation/Temperature WDL    Skin Circulation/Temperature WDL WDL       Cardiac WDL    Cardiac WDL WDL       Peripheral/Neurovascular WDL    Peripheral Neurovascular WDL WDL       Cognitive/Neuro/Behavioral WDL    Cognitive/Neuro/Behavioral WDL WDL

## 2023-09-22 ENCOUNTER — APPOINTMENT (OUTPATIENT)
Dept: GENERAL RADIOLOGY | Facility: CLINIC | Age: 88
End: 2023-09-22
Attending: STUDENT IN AN ORGANIZED HEALTH CARE EDUCATION/TRAINING PROGRAM
Payer: COMMERCIAL

## 2023-09-22 ENCOUNTER — HOSPITAL ENCOUNTER (EMERGENCY)
Facility: CLINIC | Age: 88
Discharge: HOME | End: 2023-09-22
Attending: STUDENT IN AN ORGANIZED HEALTH CARE EDUCATION/TRAINING PROGRAM | Admitting: STUDENT IN AN ORGANIZED HEALTH CARE EDUCATION/TRAINING PROGRAM
Payer: COMMERCIAL

## 2023-09-22 VITALS
SYSTOLIC BLOOD PRESSURE: 115 MMHG | TEMPERATURE: 97.6 F | OXYGEN SATURATION: 96 % | HEART RATE: 72 BPM | RESPIRATION RATE: 20 BRPM | DIASTOLIC BLOOD PRESSURE: 77 MMHG

## 2023-09-22 DIAGNOSIS — R06.02 SOB (SHORTNESS OF BREATH): ICD-10-CM

## 2023-09-22 DIAGNOSIS — E11.9 TYPE 2 DIABETES MELLITUS WITHOUT COMPLICATION, UNSPECIFIED WHETHER LONG TERM INSULIN USE (H): ICD-10-CM

## 2023-09-22 DIAGNOSIS — I50.32 CHRONIC DIASTOLIC HEART FAILURE (H): Chronic | ICD-10-CM

## 2023-09-22 DIAGNOSIS — J44.9 CHRONIC OBSTRUCTIVE PULMONARY DISEASE, UNSPECIFIED COPD TYPE (H): ICD-10-CM

## 2023-09-22 DIAGNOSIS — U07.1 COVID-19: ICD-10-CM

## 2023-09-22 PROBLEM — L71.9 ROSACEA: Status: ACTIVE | Noted: 2023-09-22

## 2023-09-22 PROBLEM — Z96.641 STATUS POST TOTAL REPLACEMENT OF RIGHT HIP: Status: ACTIVE | Noted: 2017-03-23

## 2023-09-22 PROBLEM — F11.90 CHRONIC NARCOTIC USE: Status: ACTIVE | Noted: 2017-03-30

## 2023-09-22 PROBLEM — I71.40 AAA (ABDOMINAL AORTIC ANEURYSM) WITHOUT RUPTURE (H): Status: ACTIVE | Noted: 2019-07-10

## 2023-09-22 PROBLEM — Z85.51 HISTORY OF PRIMARY MALIGNANT NEOPLASM OF URINARY BLADDER: Status: ACTIVE | Noted: 2023-09-22

## 2023-09-22 PROBLEM — F34.1 PERSISTENT DEPRESSIVE DISORDER: Status: ACTIVE | Noted: 2020-12-04

## 2023-09-22 PROBLEM — E11.29 MICROALBUMINURIA DUE TO TYPE 2 DIABETES MELLITUS (H): Status: ACTIVE | Noted: 2023-05-11

## 2023-09-22 PROBLEM — R80.9 MICROALBUMINURIA DUE TO TYPE 2 DIABETES MELLITUS (H): Status: ACTIVE | Noted: 2023-05-11

## 2023-09-22 PROBLEM — D69.6 THROMBOCYTOPENIA (H): Status: ACTIVE | Noted: 2023-09-22

## 2023-09-22 PROBLEM — E34.50: Status: ACTIVE | Noted: 2023-09-22

## 2023-09-22 PROBLEM — K21.9 GERD WITHOUT ESOPHAGITIS: Status: ACTIVE | Noted: 2017-03-23

## 2023-09-22 PROBLEM — K86.81 EXOCRINE PANCREATIC INSUFFICIENCY: Status: ACTIVE | Noted: 2019-09-05

## 2023-09-22 PROBLEM — N18.30 CKD (CHRONIC KIDNEY DISEASE) STAGE 3, GFR 30-59 ML/MIN (H): Status: ACTIVE | Noted: 2017-01-16

## 2023-09-22 PROBLEM — Z79.891 LONG TERM (CURRENT) USE OF OPIATE ANALGESIC: Status: ACTIVE | Noted: 2023-09-22

## 2023-09-22 PROBLEM — J44.1 COPD EXACERBATION (H): Status: ACTIVE | Noted: 2018-12-26

## 2023-09-22 PROBLEM — G89.4 CHRONIC PAIN DISORDER: Status: ACTIVE | Noted: 2017-08-04

## 2023-09-22 PROBLEM — I48.91 ATRIAL FIBRILLATION (H): Status: ACTIVE | Noted: 2023-09-22

## 2023-09-22 PROBLEM — J90 BILATERAL PLEURAL EFFUSION: Status: ACTIVE | Noted: 2018-12-26

## 2023-09-22 PROBLEM — D50.0 IRON DEFICIENCY ANEMIA DUE TO CHRONIC BLOOD LOSS: Status: ACTIVE | Noted: 2017-12-21

## 2023-09-22 PROBLEM — I27.21 MODERATE PULMONARY ARTERIAL SYSTOLIC HYPERTENSION (H): Status: ACTIVE | Noted: 2019-02-01

## 2023-09-22 PROBLEM — M48.061 SPINAL STENOSIS, LUMBAR REGION WITHOUT NEUROGENIC CLAUDICATION: Status: ACTIVE | Noted: 2023-09-22

## 2023-09-22 PROBLEM — M16.10 OSTEOARTHRITIS OF PELVIS: Status: ACTIVE | Noted: 2023-09-22

## 2023-09-22 PROBLEM — H90.3 BILATERAL NEURAL HEARING LOSS: Status: ACTIVE | Noted: 2023-09-22

## 2023-09-22 PROBLEM — N40.0 BENIGN PROSTATIC HYPERPLASIA: Status: ACTIVE | Noted: 2023-09-22

## 2023-09-22 PROBLEM — K55.20 SMALL BOWEL ARTERIOVENOUS MALFORMATION: Status: ACTIVE | Noted: 2019-08-08

## 2023-09-22 PROBLEM — M51.06 LUMBAR DISC DISORDER WITH MYELOPATHY: Status: ACTIVE | Noted: 2017-08-04

## 2023-09-22 PROBLEM — F52.21 MALE ERECTILE DISORDER (CODE): Status: ACTIVE | Noted: 2023-09-22

## 2023-09-22 LAB
ANION GAP SERPL CALCULATED.3IONS-SCNC: 10 MMOL/L (ref 7–15)
BASOPHILS # BLD AUTO: 0 10E3/UL (ref 0–0.2)
BASOPHILS NFR BLD AUTO: 0 %
BUN SERPL-MCNC: 18.3 MG/DL (ref 8–23)
CALCIUM SERPL-MCNC: 8.9 MG/DL (ref 8.8–10.2)
CHLORIDE SERPL-SCNC: 103 MMOL/L (ref 98–107)
CREAT SERPL-MCNC: 1.34 MG/DL (ref 0.67–1.17)
DEPRECATED HCO3 PLAS-SCNC: 26 MMOL/L (ref 22–29)
EGFRCR SERPLBLD CKD-EPI 2021: 51 ML/MIN/1.73M2
EOSINOPHIL # BLD AUTO: 0 10E3/UL (ref 0–0.7)
EOSINOPHIL NFR BLD AUTO: 1 %
ERYTHROCYTE [DISTWIDTH] IN BLOOD BY AUTOMATED COUNT: 16.8 % (ref 10–15)
FLUAV RNA SPEC QL NAA+PROBE: NEGATIVE
FLUBV RNA RESP QL NAA+PROBE: NEGATIVE
GLUCOSE SERPL-MCNC: 124 MG/DL (ref 70–99)
HCT VFR BLD AUTO: 41 % (ref 40–53)
HGB BLD-MCNC: 13.3 G/DL (ref 13.3–17.7)
HOLD SPECIMEN: NORMAL
HOLD SPECIMEN: NORMAL
IMM GRANULOCYTES # BLD: 0 10E3/UL
IMM GRANULOCYTES NFR BLD: 0 %
LYMPHOCYTES # BLD AUTO: 1.6 10E3/UL (ref 0.8–5.3)
LYMPHOCYTES NFR BLD AUTO: 26 %
MCH RBC QN AUTO: 31.4 PG (ref 26.5–33)
MCHC RBC AUTO-ENTMCNC: 32.4 G/DL (ref 31.5–36.5)
MCV RBC AUTO: 97 FL (ref 78–100)
MONOCYTES # BLD AUTO: 0.9 10E3/UL (ref 0–1.3)
MONOCYTES NFR BLD AUTO: 15 %
NEUTROPHILS # BLD AUTO: 3.5 10E3/UL (ref 1.6–8.3)
NEUTROPHILS NFR BLD AUTO: 58 %
NRBC # BLD AUTO: 0 10E3/UL
NRBC BLD AUTO-RTO: 0 /100
PLATELET # BLD AUTO: 129 10E3/UL (ref 150–450)
POTASSIUM SERPL-SCNC: 4.2 MMOL/L (ref 3.4–5.3)
RBC # BLD AUTO: 4.23 10E6/UL (ref 4.4–5.9)
RSV RNA SPEC NAA+PROBE: NEGATIVE
SARS-COV-2 RNA RESP QL NAA+PROBE: POSITIVE
SODIUM SERPL-SCNC: 139 MMOL/L (ref 136–145)
TROPONIN T SERPL HS-MCNC: 50 NG/L
TROPONIN T SERPL HS-MCNC: 58 NG/L
WBC # BLD AUTO: 6.1 10E3/UL (ref 4–11)

## 2023-09-22 PROCEDURE — 85025 COMPLETE CBC W/AUTO DIFF WBC: CPT | Performed by: STUDENT IN AN ORGANIZED HEALTH CARE EDUCATION/TRAINING PROGRAM

## 2023-09-22 PROCEDURE — 80048 BASIC METABOLIC PNL TOTAL CA: CPT | Performed by: STUDENT IN AN ORGANIZED HEALTH CARE EDUCATION/TRAINING PROGRAM

## 2023-09-22 PROCEDURE — 87637 SARSCOV2&INF A&B&RSV AMP PRB: CPT | Performed by: STUDENT IN AN ORGANIZED HEALTH CARE EDUCATION/TRAINING PROGRAM

## 2023-09-22 PROCEDURE — 36415 COLL VENOUS BLD VENIPUNCTURE: CPT | Performed by: STUDENT IN AN ORGANIZED HEALTH CARE EDUCATION/TRAINING PROGRAM

## 2023-09-22 PROCEDURE — 93005 ELECTROCARDIOGRAM TRACING: CPT

## 2023-09-22 PROCEDURE — 71046 X-RAY EXAM CHEST 2 VIEWS: CPT

## 2023-09-22 PROCEDURE — 84484 ASSAY OF TROPONIN QUANT: CPT | Performed by: STUDENT IN AN ORGANIZED HEALTH CARE EDUCATION/TRAINING PROGRAM

## 2023-09-22 PROCEDURE — 99285 EMERGENCY DEPT VISIT HI MDM: CPT | Mod: 25

## 2023-09-22 ASSESSMENT — ACTIVITIES OF DAILY LIVING (ADL)
ADLS_ACUITY_SCORE: 35

## 2023-09-22 NOTE — ED NOTES
Respiratory (Adult)Airway WDL:  (pt comes in with productive cough, sputum is clear to brown at times. intermittent SOB. pt had a + covid test at home. pt complains that he is easily fatigued. pt recently was at a large gathering for a wedding.)

## 2023-09-22 NOTE — ED PROVIDER NOTES
History     Chief Complaint:  Shortness of Breath       HPI   Crow Salinas is a very pleasant 88 year old male presenting with shortness of breath.  Patient has a complex medical history including atrial fibrillation, CAD, CHF, type 2 diabetes.  Presents complaining of cold like symptoms for the last 5 days.  Last night both he and his wife started to feel short of breath.  They both tested positive for COVID at home.  Patient endorses increased shortness of breath with ambulation, better with rest. No fever or chills. No chest pain, new back pain, new abdominal pain.  He was vaccinated against COVID x2.    Independent Historian:   None - Patient Only    Review of External Notes:   None.      Medications:    Glucotrol   Cardura   Lopressor  Protonix   Eliquis   Lipitor  Viagra   Lasix  Losartan   Percocet   Lexapro  Omeprazole   Metformin     Past Medical History:    Atrial fibrillation  Bilateral pleural effusion  COPD   Severe sepsis  Chronic pain syndrome   Chronic narcotic use   GERD   CKD   Diabetes mellitus  Hypertension  Erectile dysfunction  Mild aortic sclerosis  Hypercholesterolemia   AAA  Lumbar disc disorder with myelopathy     Past Surgical History:    Cholecystectomy  Bladder removal  Left knee replacement       Physical Exam   Patient Vitals for the past 24 hrs:   BP Temp Pulse Resp SpO2   09/22/23 1416 115/77 -- 72 -- 96 %   09/22/23 1339 90/56 97.6  F (36.4  C) 66 20 95 %   09/22/23 1336 -- -- 77 -- --        Physical Exam  Vitals and nursing note reviewed.   Constitutional:       General: He is not in acute distress.     Appearance: He is well-developed. He is not ill-appearing.   HENT:      Mouth/Throat:      Mouth: Mucous membranes are moist.      Pharynx: Oropharynx is clear.   Cardiovascular:      Rate and Rhythm: Normal rate and regular rhythm.   Pulmonary:      Effort: Pulmonary effort is normal.      Breath sounds: Normal breath sounds. No decreased breath sounds, wheezing, rhonchi or  rales.   Musculoskeletal:      Right lower leg: No edema.      Left lower leg: No edema.   Neurological:      Mental Status: He is alert and oriented to person, place, and time.           Emergency Department Course   ECG  ECG results from 09/22/23   EKG 12 lead     Value    Systolic Blood Pressure     Diastolic Blood Pressure     Ventricular Rate 75    Atrial Rate 277    OR Interval     QRS Duration 86        QTc 437    P Axis     R AXIS 52    T Axis 48    Interpretation ECG      Atrial fibrillation  Abnormal ECG  When compared with ECG of 16-JUN-2022 11:48,  No significant change was found          Imaging:  Chest XR,  PA & LAT   Final Result   IMPRESSION: No significant interval change. No new focal   consolidation, pleural effusion or pneumothorax. Cardiomediastinal   silhouette is unremarkable.       BUD PENA MD            SYSTEM ID:  [id number]         Report per radiology    Laboratory:  Labs Ordered and Resulted from Time of ED Arrival to Time of ED Departure   BASIC METABOLIC PANEL - Abnormal       Result Value    Sodium 139      Potassium 4.2      Chloride 103      Carbon Dioxide (CO2) 26      Anion Gap 10      Urea Nitrogen 18.3      Creatinine 1.34 (*)     Calcium 8.9      Glucose 124 (*)     GFR Estimate 51 (*)    CBC WITH PLATELETS AND DIFFERENTIAL - Abnormal    WBC Count 6.1      RBC Count 4.23 (*)     Hemoglobin 13.3      Hematocrit 41.0      MCV 97      MCH 31.4      MCHC 32.4      RDW 16.8 (*)     Platelet Count 129 (*)     % Neutrophils 58      % Lymphocytes 26      % Monocytes 15      % Eosinophils 1      % Basophils 0      % Immature Granulocytes 0      NRBCs per 100 WBC 0      Absolute Neutrophils 3.5      Absolute Lymphocytes 1.6      Absolute Monocytes 0.9      Absolute Eosinophils 0.0      Absolute Basophils 0.0      Absolute Immature Granulocytes 0.0      Absolute NRBCs 0.0     INFLUENZA A/B, RSV, & SARS-COV2 PCR - Abnormal    Influenza A PCR Negative      Influenza B PCR  Negative      RSV PCR Negative      SARS CoV2 PCR Positive (*)    TROPONIN T, HIGH SENSITIVITY - Abnormal    Troponin T, High Sensitivity 58 (*)    TROPONIN T, HIGH SENSITIVITY - Abnormal    Troponin T, High Sensitivity 50 (*)         Procedures   None performed      Emergency Department Course & Assessments:     Interventions:  Medications - No data to display     Assessments:  1903 I rechecked and updated the patient. Patient is requesting to be discharged.     Independent Interpretation (X-rays, CTs, rhythm strip):  I independently interpreted the patient's chest x-ray; reassuring against infiltrate.    Consultations/Discussion of Management or Tests:  None    Social Determinants of Health affecting care:   None.      Disposition:  The patient was discharged to home.     Impression & Plan   CMS Diagnoses: None      Medical Decision Making:      Patient with extensive medical history presenting with shortness of breath.  Considered broad differential including COPD exacerbation, CHF exacerbation, acute coronary syndrome, COVID-pneumonia, bacterial pneumonia, among others.  Low suspicion for pulmonary embolism given no hypoxemia, no tachycardia, no signs or symptoms of DVT.  Patient did test positive for COVID.  On ambulation, the patient did not have hypoxemia.  EKG showed atrial fibrillation.  No ST segment elevation or depression or T wave concerning for acute myocardial ischemia.  Troponin was slightly elevated initially but on repeat it was stable.  Did discuss with the patient performing additional testing to evaluate but the patient was very keen to be discharged to see his wife, he was admitted to the hospital for COVID.  Low likelihood of CHF exacerbation or COPD exacerbation with normal lung exam, no hypoxemia.  No evidence of pneumonia on x-ray and no fever, low likelihood of bacterial pneumonia superimposed on COVID.  Did discuss with the patient Conchita.  His primary care physician had mentioned this  to him.  However, as he has had symptoms for more than 5 days at this point, he is not a candidate for Paxlovid.  Findings were discussed.  Additional verbal instructions were provided.  Plan for continued symptomatic management at home. Plan for follow-up with primary care clinician  in 1 week for as needed for reevaluation. I discussed specific warning signs and instructed the patient to return to the ED if there are any concerns. Understanding of instructions was voiced, questions were answered and the patient was discharged.     Critical Care time was 0 minutes for this patient excluding procedures.      Diagnosis:    ICD-10-CM    1. COVID-19  U07.1       2. SOB (shortness of breath)  R06.02       3. Chronic obstructive pulmonary disease, unspecified COPD type (H)  J44.9       4. Chronic diastolic heart failure (H)  I50.32       5. Type 2 diabetes mellitus without complication, unspecified whether long term insulin use (H)  E11.9            Discharge Medications:  There are no discharge medications for this patient.        Malik Dillard MD  09/22/23 [id number]

## 2023-09-22 NOTE — ED TRIAGE NOTES
Pt presents to ED with shortness of breath and cough. States he has had symptoms for about 1 week. Yesterday tested positive for covid. Wife ill with similar symptoms. Also reports weakness and dizziness on standing.

## 2023-09-23 NOTE — DISCHARGE INSTRUCTIONS
Thank you for allowing us to evaluate you today.  Follow up with primary care clinician  in 1 week for reevaluation..  For fever, use acetaminophen (Tylenol) and/or ibuprofen.  Your labs here were reassuring  Please read the guidance provided with your discharge instructions.  Immediately return to the emergency department with any concerns.

## 2023-09-25 LAB
ATRIAL RATE - MUSE: 277 BPM
DIASTOLIC BLOOD PRESSURE - MUSE: NORMAL MMHG
INTERPRETATION ECG - MUSE: NORMAL
P AXIS - MUSE: NORMAL DEGREES
PR INTERVAL - MUSE: NORMAL MS
QRS DURATION - MUSE: 86 MS
QT - MUSE: 392 MS
QTC - MUSE: 437 MS
R AXIS - MUSE: 52 DEGREES
SYSTOLIC BLOOD PRESSURE - MUSE: NORMAL MMHG
T AXIS - MUSE: 48 DEGREES
VENTRICULAR RATE- MUSE: 75 BPM

## 2024-11-01 ENCOUNTER — APPOINTMENT (OUTPATIENT)
Dept: GENERAL RADIOLOGY | Facility: CLINIC | Age: 89
DRG: 291 | End: 2024-11-01
Attending: EMERGENCY MEDICINE
Payer: COMMERCIAL

## 2024-11-01 ENCOUNTER — HOSPITAL ENCOUNTER (INPATIENT)
Facility: CLINIC | Age: 89
LOS: 3 days | Discharge: HOME | DRG: 291 | End: 2024-11-04
Attending: EMERGENCY MEDICINE | Admitting: HOSPITALIST
Payer: COMMERCIAL

## 2024-11-01 DIAGNOSIS — N52.8 OTHER MALE ERECTILE DYSFUNCTION: ICD-10-CM

## 2024-11-01 DIAGNOSIS — J44.1 COPD EXACERBATION (H): ICD-10-CM

## 2024-11-01 DIAGNOSIS — I10 ESSENTIAL HYPERTENSION: Primary | Chronic | ICD-10-CM

## 2024-11-01 DIAGNOSIS — Z96.641 STATUS POST TOTAL REPLACEMENT OF RIGHT HIP: ICD-10-CM

## 2024-11-01 DIAGNOSIS — J15.9 COMMUNITY ACQUIRED BACTERIAL PNEUMONIA: ICD-10-CM

## 2024-11-01 DIAGNOSIS — R79.89 ELEVATED BRAIN NATRIURETIC PEPTIDE (BNP) LEVEL: ICD-10-CM

## 2024-11-01 DIAGNOSIS — J96.01 ACUTE RESPIRATORY FAILURE WITH HYPOXIA (H): ICD-10-CM

## 2024-11-01 LAB
ALBUMIN SERPL BCG-MCNC: 3.9 G/DL (ref 3.5–5.2)
ALP SERPL-CCNC: 106 U/L (ref 40–150)
ALT SERPL W P-5'-P-CCNC: 17 U/L (ref 0–70)
ANION GAP SERPL CALCULATED.3IONS-SCNC: 13 MMOL/L (ref 7–15)
AST SERPL W P-5'-P-CCNC: 27 U/L (ref 0–45)
BASOPHILS # BLD AUTO: 0 10E3/UL (ref 0–0.2)
BASOPHILS NFR BLD AUTO: 0 %
BILIRUB SERPL-MCNC: 0.9 MG/DL
BUN SERPL-MCNC: 15 MG/DL (ref 8–23)
CALCIUM SERPL-MCNC: 9 MG/DL (ref 8.8–10.4)
CHLORIDE SERPL-SCNC: 102 MMOL/L (ref 98–107)
CREAT SERPL-MCNC: 1.19 MG/DL (ref 0.67–1.17)
EGFRCR SERPLBLD CKD-EPI 2021: 58 ML/MIN/1.73M2
EOSINOPHIL # BLD AUTO: 0 10E3/UL (ref 0–0.7)
EOSINOPHIL NFR BLD AUTO: 0 %
ERYTHROCYTE [DISTWIDTH] IN BLOOD BY AUTOMATED COUNT: 16.3 % (ref 10–15)
FLUAV RNA SPEC QL NAA+PROBE: NEGATIVE
FLUBV RNA RESP QL NAA+PROBE: NEGATIVE
GLUCOSE SERPL-MCNC: 113 MG/DL (ref 70–99)
HCO3 SERPL-SCNC: 22 MMOL/L (ref 22–29)
HCT VFR BLD AUTO: 41.8 % (ref 40–53)
HGB BLD-MCNC: 13.7 G/DL (ref 13.3–17.7)
HOLD SPECIMEN: NORMAL
HOLD SPECIMEN: NORMAL
IMM GRANULOCYTES # BLD: 0.1 10E3/UL
IMM GRANULOCYTES NFR BLD: 1 %
LACTATE SERPL-SCNC: 1.5 MMOL/L (ref 0.7–2)
LYMPHOCYTES # BLD AUTO: 0.7 10E3/UL (ref 0.8–5.3)
LYMPHOCYTES NFR BLD AUTO: 7 %
MCH RBC QN AUTO: 31.4 PG (ref 26.5–33)
MCHC RBC AUTO-ENTMCNC: 32.8 G/DL (ref 31.5–36.5)
MCV RBC AUTO: 96 FL (ref 78–100)
MONOCYTES # BLD AUTO: 1.2 10E3/UL (ref 0–1.3)
MONOCYTES NFR BLD AUTO: 13 %
NEUTROPHILS # BLD AUTO: 7.5 10E3/UL (ref 1.6–8.3)
NEUTROPHILS NFR BLD AUTO: 79 %
NRBC # BLD AUTO: 0 10E3/UL
NRBC BLD AUTO-RTO: 0 /100
NT-PROBNP SERPL-MCNC: 4609 PG/ML (ref 0–1800)
PLATELET # BLD AUTO: 135 10E3/UL (ref 150–450)
POTASSIUM SERPL-SCNC: 4.1 MMOL/L (ref 3.4–5.3)
PROT SERPL-MCNC: 7 G/DL (ref 6.4–8.3)
RBC # BLD AUTO: 4.37 10E6/UL (ref 4.4–5.9)
RSV RNA SPEC NAA+PROBE: NEGATIVE
SARS-COV-2 RNA RESP QL NAA+PROBE: NEGATIVE
SODIUM SERPL-SCNC: 137 MMOL/L (ref 135–145)
TROPONIN T SERPL HS-MCNC: 37 NG/L
TROPONIN T SERPL HS-MCNC: 38 NG/L
WBC # BLD AUTO: 9.4 10E3/UL (ref 4–11)

## 2024-11-01 PROCEDURE — 258N000003 HC RX IP 258 OP 636: Performed by: EMERGENCY MEDICINE

## 2024-11-01 PROCEDURE — 83880 ASSAY OF NATRIURETIC PEPTIDE: CPT | Performed by: EMERGENCY MEDICINE

## 2024-11-01 PROCEDURE — 120N000001 HC R&B MED SURG/OB

## 2024-11-01 PROCEDURE — 82435 ASSAY OF BLOOD CHLORIDE: CPT | Performed by: EMERGENCY MEDICINE

## 2024-11-01 PROCEDURE — 84145 PROCALCITONIN (PCT): CPT | Performed by: HOSPITALIST

## 2024-11-01 PROCEDURE — 71046 X-RAY EXAM CHEST 2 VIEWS: CPT

## 2024-11-01 PROCEDURE — 82247 BILIRUBIN TOTAL: CPT | Performed by: EMERGENCY MEDICINE

## 2024-11-01 PROCEDURE — 96375 TX/PRO/DX INJ NEW DRUG ADDON: CPT

## 2024-11-01 PROCEDURE — 250N000013 HC RX MED GY IP 250 OP 250 PS 637: Performed by: EMERGENCY MEDICINE

## 2024-11-01 PROCEDURE — 36415 COLL VENOUS BLD VENIPUNCTURE: CPT | Performed by: EMERGENCY MEDICINE

## 2024-11-01 PROCEDURE — 84155 ASSAY OF PROTEIN SERUM: CPT | Performed by: EMERGENCY MEDICINE

## 2024-11-01 PROCEDURE — 84484 ASSAY OF TROPONIN QUANT: CPT | Performed by: EMERGENCY MEDICINE

## 2024-11-01 PROCEDURE — 87040 BLOOD CULTURE FOR BACTERIA: CPT | Performed by: EMERGENCY MEDICINE

## 2024-11-01 PROCEDURE — 85004 AUTOMATED DIFF WBC COUNT: CPT | Performed by: EMERGENCY MEDICINE

## 2024-11-01 PROCEDURE — 87637 SARSCOV2&INF A&B&RSV AMP PRB: CPT | Performed by: EMERGENCY MEDICINE

## 2024-11-01 PROCEDURE — 99285 EMERGENCY DEPT VISIT HI MDM: CPT | Mod: 25

## 2024-11-01 PROCEDURE — 250N000011 HC RX IP 250 OP 636: Performed by: EMERGENCY MEDICINE

## 2024-11-01 PROCEDURE — 93005 ELECTROCARDIOGRAM TRACING: CPT

## 2024-11-01 PROCEDURE — 96365 THER/PROPH/DIAG IV INF INIT: CPT

## 2024-11-01 PROCEDURE — 83605 ASSAY OF LACTIC ACID: CPT | Performed by: EMERGENCY MEDICINE

## 2024-11-01 RX ORDER — AZITHROMYCIN 500 MG/5ML
500 INJECTION, POWDER, LYOPHILIZED, FOR SOLUTION INTRAVENOUS ONCE
Status: COMPLETED | OUTPATIENT
Start: 2024-11-01 | End: 2024-11-02

## 2024-11-01 RX ORDER — ACETAMINOPHEN 500 MG
1000 TABLET ORAL ONCE
Status: COMPLETED | OUTPATIENT
Start: 2024-11-01 | End: 2024-11-01

## 2024-11-01 RX ORDER — CEFTRIAXONE 1 G/1
1 INJECTION, POWDER, FOR SOLUTION INTRAMUSCULAR; INTRAVENOUS ONCE
Status: COMPLETED | OUTPATIENT
Start: 2024-11-01 | End: 2024-11-01

## 2024-11-01 RX ADMIN — CEFTRIAXONE 1 G: 1 INJECTION, POWDER, FOR SOLUTION INTRAMUSCULAR; INTRAVENOUS at 23:02

## 2024-11-01 RX ADMIN — ACETAMINOPHEN 1000 MG: 500 TABLET ORAL at 21:02

## 2024-11-01 RX ADMIN — AZITHROMYCIN MONOHYDRATE 500 MG: 500 INJECTION, POWDER, LYOPHILIZED, FOR SOLUTION INTRAVENOUS at 23:34

## 2024-11-01 ASSESSMENT — ACTIVITIES OF DAILY LIVING (ADL)
ADLS_ACUITY_SCORE: 0

## 2024-11-01 ASSESSMENT — COLUMBIA-SUICIDE SEVERITY RATING SCALE - C-SSRS
1. IN THE PAST MONTH, HAVE YOU WISHED YOU WERE DEAD OR WISHED YOU COULD GO TO SLEEP AND NOT WAKE UP?: NO
6. HAVE YOU EVER DONE ANYTHING, STARTED TO DO ANYTHING, OR PREPARED TO DO ANYTHING TO END YOUR LIFE?: NO
2. HAVE YOU ACTUALLY HAD ANY THOUGHTS OF KILLING YOURSELF IN THE PAST MONTH?: NO

## 2024-11-02 ENCOUNTER — APPOINTMENT (OUTPATIENT)
Dept: CARDIOLOGY | Facility: CLINIC | Age: 89
DRG: 291 | End: 2024-11-02
Attending: HOSPITALIST
Payer: COMMERCIAL

## 2024-11-02 LAB
ADV 40+41 DNA STL QL NAA+NON-PROBE: NEGATIVE
ANION GAP SERPL CALCULATED.3IONS-SCNC: 15 MMOL/L (ref 7–15)
ASTRO TYP 1-8 RNA STL QL NAA+NON-PROBE: NEGATIVE
BUN SERPL-MCNC: 17.8 MG/DL (ref 8–23)
C CAYETANENSIS DNA STL QL NAA+NON-PROBE: NEGATIVE
C DIFF TOX B STL QL: NEGATIVE
C PNEUM DNA SPEC QL NAA+PROBE: NOT DETECTED
CALCIUM SERPL-MCNC: 8.5 MG/DL (ref 8.8–10.4)
CAMPYLOBACTER DNA SPEC NAA+PROBE: NEGATIVE
CHLORIDE SERPL-SCNC: 101 MMOL/L (ref 98–107)
CREAT SERPL-MCNC: 1.26 MG/DL (ref 0.67–1.17)
CRYPTOSP DNA STL QL NAA+NON-PROBE: NEGATIVE
E COLI O157 DNA STL QL NAA+NON-PROBE: NORMAL
E HISTOLYT DNA STL QL NAA+NON-PROBE: NEGATIVE
EAEC ASTA GENE ISLT QL NAA+PROBE: NEGATIVE
EC STX1+STX2 GENES STL QL NAA+NON-PROBE: NEGATIVE
EGFRCR SERPLBLD CKD-EPI 2021: 55 ML/MIN/1.73M2
EPEC EAE GENE STL QL NAA+NON-PROBE: NEGATIVE
ERYTHROCYTE [DISTWIDTH] IN BLOOD BY AUTOMATED COUNT: 16.4 % (ref 10–15)
ETEC LTA+ST1A+ST1B TOX ST NAA+NON-PROBE: NEGATIVE
FLUAV H1 2009 PAND RNA SPEC QL NAA+PROBE: NOT DETECTED
FLUAV H1 RNA SPEC QL NAA+PROBE: NOT DETECTED
FLUAV H3 RNA SPEC QL NAA+PROBE: NOT DETECTED
FLUAV RNA SPEC QL NAA+PROBE: NOT DETECTED
FLUBV RNA SPEC QL NAA+PROBE: NOT DETECTED
G LAMBLIA DNA STL QL NAA+NON-PROBE: NEGATIVE
GLUCOSE SERPL-MCNC: 125 MG/DL (ref 70–99)
HADV DNA SPEC QL NAA+PROBE: NOT DETECTED
HCO3 SERPL-SCNC: 24 MMOL/L (ref 22–29)
HCOV PNL SPEC NAA+PROBE: NOT DETECTED
HCT VFR BLD AUTO: 43.2 % (ref 40–53)
HGB BLD-MCNC: 13.9 G/DL (ref 13.3–17.7)
HMPV RNA SPEC QL NAA+PROBE: NOT DETECTED
HPIV1 RNA SPEC QL NAA+PROBE: NOT DETECTED
HPIV2 RNA SPEC QL NAA+PROBE: NOT DETECTED
HPIV3 RNA SPEC QL NAA+PROBE: NOT DETECTED
HPIV4 RNA SPEC QL NAA+PROBE: NOT DETECTED
LVEF ECHO: NORMAL
M PNEUMO DNA SPEC QL NAA+PROBE: NOT DETECTED
MCH RBC QN AUTO: 31 PG (ref 26.5–33)
MCHC RBC AUTO-ENTMCNC: 32.2 G/DL (ref 31.5–36.5)
MCV RBC AUTO: 96 FL (ref 78–100)
NOROVIRUS GI+II RNA STL QL NAA+NON-PROBE: NEGATIVE
P SHIGELLOIDES DNA STL QL NAA+NON-PROBE: NEGATIVE
PLATELET # BLD AUTO: 126 10E3/UL (ref 150–450)
POTASSIUM SERPL-SCNC: 4 MMOL/L (ref 3.4–5.3)
PROCALCITONIN SERPL IA-MCNC: 0.09 NG/ML
RBC # BLD AUTO: 4.49 10E6/UL (ref 4.4–5.9)
RSV RNA SPEC QL NAA+PROBE: NOT DETECTED
RSV RNA SPEC QL NAA+PROBE: NOT DETECTED
RV+EV RNA SPEC QL NAA+PROBE: DETECTED
RVA RNA STL QL NAA+NON-PROBE: NEGATIVE
SALMONELLA SP RPOD STL QL NAA+PROBE: NEGATIVE
SAPO I+II+IV+V RNA STL QL NAA+NON-PROBE: NEGATIVE
SHIGELLA SP+EIEC IPAH ST NAA+NON-PROBE: NEGATIVE
SODIUM SERPL-SCNC: 140 MMOL/L (ref 135–145)
V CHOLERAE DNA SPEC QL NAA+PROBE: NEGATIVE
VIBRIO DNA SPEC NAA+PROBE: NEGATIVE
WBC # BLD AUTO: 6.6 10E3/UL (ref 4–11)
Y ENTEROCOL DNA STL QL NAA+PROBE: NEGATIVE

## 2024-11-02 PROCEDURE — 36415 COLL VENOUS BLD VENIPUNCTURE: CPT | Performed by: HOSPITALIST

## 2024-11-02 PROCEDURE — 85018 HEMOGLOBIN: CPT | Performed by: HOSPITALIST

## 2024-11-02 PROCEDURE — 999N000156 HC STATISTIC RCP CONSULT EA 30 MIN

## 2024-11-02 PROCEDURE — 999N000157 HC STATISTIC RCP TIME EA 10 MIN

## 2024-11-02 PROCEDURE — 80048 BASIC METABOLIC PNL TOTAL CA: CPT | Performed by: HOSPITALIST

## 2024-11-02 PROCEDURE — 93010 ELECTROCARDIOGRAM REPORT: CPT | Performed by: INTERNAL MEDICINE

## 2024-11-02 PROCEDURE — 250N000011 HC RX IP 250 OP 636: Performed by: HOSPITALIST

## 2024-11-02 PROCEDURE — 82374 ASSAY BLOOD CARBON DIOXIDE: CPT | Performed by: HOSPITALIST

## 2024-11-02 PROCEDURE — 87493 C DIFF AMPLIFIED PROBE: CPT | Performed by: INTERNAL MEDICINE

## 2024-11-02 PROCEDURE — 250N000012 HC RX MED GY IP 250 OP 636 PS 637: Performed by: HOSPITALIST

## 2024-11-02 PROCEDURE — 87486 CHLMYD PNEUM DNA AMP PROBE: CPT | Performed by: HOSPITALIST

## 2024-11-02 PROCEDURE — 94640 AIRWAY INHALATION TREATMENT: CPT | Mod: 76

## 2024-11-02 PROCEDURE — 94640 AIRWAY INHALATION TREATMENT: CPT

## 2024-11-02 PROCEDURE — 250N000013 HC RX MED GY IP 250 OP 250 PS 637: Performed by: INTERNAL MEDICINE

## 2024-11-02 PROCEDURE — 93306 TTE W/DOPPLER COMPLETE: CPT

## 2024-11-02 PROCEDURE — 99223 1ST HOSP IP/OBS HIGH 75: CPT | Performed by: HOSPITALIST

## 2024-11-02 PROCEDURE — 250N000013 HC RX MED GY IP 250 OP 250 PS 637: Performed by: HOSPITALIST

## 2024-11-02 PROCEDURE — 250N000009 HC RX 250: Performed by: INTERNAL MEDICINE

## 2024-11-02 PROCEDURE — 250N000009 HC RX 250: Performed by: HOSPITALIST

## 2024-11-02 PROCEDURE — 99418 PROLNG IP/OBS E/M EA 15 MIN: CPT | Performed by: HOSPITALIST

## 2024-11-02 PROCEDURE — 120N000001 HC R&B MED SURG/OB

## 2024-11-02 PROCEDURE — 99207 PR NO BILLABLE SERVICE THIS VISIT: CPT | Performed by: INTERNAL MEDICINE

## 2024-11-02 PROCEDURE — 93306 TTE W/DOPPLER COMPLETE: CPT | Mod: 26 | Performed by: INTERNAL MEDICINE

## 2024-11-02 PROCEDURE — 87507 IADNA-DNA/RNA PROBE TQ 12-25: CPT | Performed by: INTERNAL MEDICINE

## 2024-11-02 RX ORDER — IPRATROPIUM BROMIDE AND ALBUTEROL SULFATE 2.5; .5 MG/3ML; MG/3ML
3 SOLUTION RESPIRATORY (INHALATION) EVERY 4 HOURS PRN
Status: DISCONTINUED | OUTPATIENT
Start: 2024-11-02 | End: 2024-11-02

## 2024-11-02 RX ORDER — ACETAMINOPHEN 325 MG/1
650 TABLET ORAL EVERY 4 HOURS PRN
Status: DISCONTINUED | OUTPATIENT
Start: 2024-11-02 | End: 2024-11-04 | Stop reason: ALTCHOICE

## 2024-11-02 RX ORDER — ONDANSETRON 4 MG/1
4 TABLET, ORALLY DISINTEGRATING ORAL EVERY 6 HOURS PRN
Status: DISCONTINUED | OUTPATIENT
Start: 2024-11-02 | End: 2024-11-04 | Stop reason: HOSPADM

## 2024-11-02 RX ORDER — IPRATROPIUM BROMIDE AND ALBUTEROL SULFATE 2.5; .5 MG/3ML; MG/3ML
3 SOLUTION RESPIRATORY (INHALATION)
Status: DISCONTINUED | OUTPATIENT
Start: 2024-11-02 | End: 2024-11-04 | Stop reason: HOSPADM

## 2024-11-02 RX ORDER — METOPROLOL TARTRATE 50 MG
50 TABLET ORAL 2 TIMES DAILY
Status: DISCONTINUED | OUTPATIENT
Start: 2024-11-02 | End: 2024-11-04

## 2024-11-02 RX ORDER — AMOXICILLIN 250 MG
2 CAPSULE ORAL 2 TIMES DAILY PRN
Status: DISCONTINUED | OUTPATIENT
Start: 2024-11-02 | End: 2024-11-04 | Stop reason: HOSPADM

## 2024-11-02 RX ORDER — GUAIFENESIN 200 MG/10ML
200 LIQUID ORAL EVERY 4 HOURS PRN
Status: DISCONTINUED | OUTPATIENT
Start: 2024-11-02 | End: 2024-11-04 | Stop reason: HOSPADM

## 2024-11-02 RX ORDER — IPRATROPIUM BROMIDE AND ALBUTEROL SULFATE 2.5; .5 MG/3ML; MG/3ML
3 SOLUTION RESPIRATORY (INHALATION)
Status: DISCONTINUED | OUTPATIENT
Start: 2024-11-02 | End: 2024-11-02

## 2024-11-02 RX ORDER — ACETAMINOPHEN 650 MG/1
650 SUPPOSITORY RECTAL EVERY 4 HOURS PRN
Status: DISCONTINUED | OUTPATIENT
Start: 2024-11-02 | End: 2024-11-04 | Stop reason: ALTCHOICE

## 2024-11-02 RX ORDER — FUROSEMIDE 40 MG/1
40 TABLET ORAL DAILY
Status: DISCONTINUED | OUTPATIENT
Start: 2024-11-02 | End: 2024-11-02

## 2024-11-02 RX ORDER — NALOXONE HYDROCHLORIDE 0.4 MG/ML
0.4 INJECTION, SOLUTION INTRAMUSCULAR; INTRAVENOUS; SUBCUTANEOUS
Status: DISCONTINUED | OUTPATIENT
Start: 2024-11-02 | End: 2024-11-04 | Stop reason: HOSPADM

## 2024-11-02 RX ORDER — AMOXICILLIN 250 MG
1 CAPSULE ORAL 2 TIMES DAILY PRN
Status: DISCONTINUED | OUTPATIENT
Start: 2024-11-02 | End: 2024-11-04 | Stop reason: HOSPADM

## 2024-11-02 RX ORDER — LIDOCAINE 40 MG/G
CREAM TOPICAL
Status: DISCONTINUED | OUTPATIENT
Start: 2024-11-02 | End: 2024-11-04 | Stop reason: HOSPADM

## 2024-11-02 RX ORDER — CALCIUM CARBONATE 500 MG/1
1000 TABLET, CHEWABLE ORAL 4 TIMES DAILY PRN
Status: DISCONTINUED | OUTPATIENT
Start: 2024-11-02 | End: 2024-11-04 | Stop reason: HOSPADM

## 2024-11-02 RX ORDER — PREDNISONE 20 MG/1
40 TABLET ORAL DAILY
Status: DISCONTINUED | OUTPATIENT
Start: 2024-11-02 | End: 2024-11-04 | Stop reason: HOSPADM

## 2024-11-02 RX ORDER — CEFTRIAXONE 1 G/1
1 INJECTION, POWDER, FOR SOLUTION INTRAMUSCULAR; INTRAVENOUS EVERY 24 HOURS
Status: DISCONTINUED | OUTPATIENT
Start: 2024-11-02 | End: 2024-11-04 | Stop reason: HOSPADM

## 2024-11-02 RX ORDER — NALOXONE HYDROCHLORIDE 0.4 MG/ML
0.2 INJECTION, SOLUTION INTRAMUSCULAR; INTRAVENOUS; SUBCUTANEOUS
Status: DISCONTINUED | OUTPATIENT
Start: 2024-11-02 | End: 2024-11-04 | Stop reason: HOSPADM

## 2024-11-02 RX ORDER — FUROSEMIDE 10 MG/ML
40 INJECTION INTRAMUSCULAR; INTRAVENOUS ONCE
Status: COMPLETED | OUTPATIENT
Start: 2024-11-02 | End: 2024-11-02

## 2024-11-02 RX ORDER — ONDANSETRON 2 MG/ML
4 INJECTION INTRAMUSCULAR; INTRAVENOUS EVERY 6 HOURS PRN
Status: DISCONTINUED | OUTPATIENT
Start: 2024-11-02 | End: 2024-11-04 | Stop reason: HOSPADM

## 2024-11-02 RX ORDER — METOPROLOL TARTRATE 50 MG
100 TABLET ORAL 2 TIMES DAILY
Status: DISCONTINUED | OUTPATIENT
Start: 2024-11-02 | End: 2024-11-02

## 2024-11-02 RX ORDER — ALBUTEROL SULFATE 0.83 MG/ML
2.5 SOLUTION RESPIRATORY (INHALATION)
Status: DISCONTINUED | OUTPATIENT
Start: 2024-11-02 | End: 2024-11-04

## 2024-11-02 RX ORDER — AZITHROMYCIN 250 MG/1
250 TABLET, FILM COATED ORAL DAILY
Status: DISCONTINUED | OUTPATIENT
Start: 2024-11-02 | End: 2024-11-04 | Stop reason: HOSPADM

## 2024-11-02 RX ADMIN — IPRATROPIUM BROMIDE AND ALBUTEROL SULFATE 3 ML: .5; 3 SOLUTION RESPIRATORY (INHALATION) at 15:58

## 2024-11-02 RX ADMIN — METOPROLOL TARTRATE 100 MG: 50 TABLET, FILM COATED ORAL at 09:51

## 2024-11-02 RX ADMIN — AZITHROMYCIN DIHYDRATE 250 MG: 250 TABLET ORAL at 21:28

## 2024-11-02 RX ADMIN — CEFTRIAXONE 1 G: 1 INJECTION, POWDER, FOR SOLUTION INTRAMUSCULAR; INTRAVENOUS at 21:28

## 2024-11-02 RX ADMIN — PREDNISONE 40 MG: 20 TABLET ORAL at 02:41

## 2024-11-02 RX ADMIN — METOPROLOL TARTRATE 50 MG: 50 TABLET, FILM COATED ORAL at 21:28

## 2024-11-02 RX ADMIN — Medication 1 LOZENGE: at 21:40

## 2024-11-02 RX ADMIN — Medication 5 MG: at 22:43

## 2024-11-02 RX ADMIN — IPRATROPIUM BROMIDE AND ALBUTEROL SULFATE 3 ML: .5; 3 SOLUTION RESPIRATORY (INHALATION) at 19:24

## 2024-11-02 RX ADMIN — GUAIFENESIN 200 MG: 100 LIQUID ORAL at 09:51

## 2024-11-02 RX ADMIN — APIXABAN 5 MG: 5 TABLET, FILM COATED ORAL at 09:51

## 2024-11-02 RX ADMIN — IPRATROPIUM BROMIDE AND ALBUTEROL SULFATE 3 ML: .5; 3 SOLUTION RESPIRATORY (INHALATION) at 12:30

## 2024-11-02 RX ADMIN — APIXABAN 5 MG: 5 TABLET, FILM COATED ORAL at 21:28

## 2024-11-02 RX ADMIN — Medication 1 LOZENGE: at 22:50

## 2024-11-02 RX ADMIN — PREDNISONE 40 MG: 20 TABLET ORAL at 09:51

## 2024-11-02 RX ADMIN — IPRATROPIUM BROMIDE AND ALBUTEROL SULFATE 3 ML: .5; 3 SOLUTION RESPIRATORY (INHALATION) at 08:38

## 2024-11-02 RX ADMIN — FUROSEMIDE 40 MG: 10 INJECTION, SOLUTION INTRAMUSCULAR; INTRAVENOUS at 02:41

## 2024-11-02 ASSESSMENT — ACTIVITIES OF DAILY LIVING (ADL)
ADLS_ACUITY_SCORE: 0
FALL_HISTORY_WITHIN_LAST_SIX_MONTHS: NO
ADLS_ACUITY_SCORE: 0

## 2024-11-02 NOTE — ED TRIAGE NOTES
Pt reports generalized weakness, shortness of breath and increased urination since yesterday day afternoon. Pt nausea. Pt has productive cough

## 2024-11-02 NOTE — ED PROVIDER NOTES
Emergency Department Note      History of Present Illness     Chief Complaint   Generalized Weakness and Shortness of Breath      HPI   Crow Salinas is a 89 year old male with a history of atrial fibrillation, hypertension, COPD, type 2 diabetes who presents with general fatigue, shortness of breath. 4-5 days ago the patient got a flu shot, yesterday began to develop general fatigue, chills, feverish, cough, shortness of breath. Today his daughter has been with him, she states that he seems very weak, not wanting to get up for anything besides using the bathroom. She also states he has not been eating or drinking anything today. She mentions that around 1930 she took his oxygen which was 88. He denies any sick contacts.     Independent Historian   Daughter as detailed above.    Review of External Notes   Cardiology visit from July of this year.     Past Medical History     Medical History and Problem List   Atrial fibrillation   AAA (abdominal aortic aneurysm) without rupture   Iron deficiency anemia due to chronic blood loss  Androgen insensitivity syndrome  Benign prostatic hyperplasia  Bilateral neural hearing loss  Bilateral pleural effusion  Chronic diastolic heart failure   CKD  COPD   Essential hypertension  Exocrine pancreatic insufficiency  GERD   History of primary malignant neoplasm of urinary bladder  Lumbar disc disorder with myelopathy  Mixed hyperlipidemia  Moderate pulmonary arterial systolic hypertension   Osteoarthritis of pelvis  Atrial fibrillation   Depression   Pure hypercholesterolemia  Rosacea  Small bowel arteriovenous malformation  Spinal stenosis  Thrombocytopenia   Type 2 diabetes   Upper GI bleed     Medications   Lipitor   Lexapro   Eliquis   Lopressor   Prilosec   Glucophage   Cozaar     Surgical History   Upper gastrointestinal endoscopy   Bladder removal   Knee replacement   Cholecystectomy     Physical Exam     Patient Vitals for the past 24 hrs:   BP Temp Temp src Pulse Resp  "SpO2 Height Weight   11/01/24 2030 (!) 124/90 99.9  F (37.7  C) Temporal 98 24 (!) 88 % 1.803 m (5' 11\") 69.5 kg (153 lb 3.5 oz)     Physical Exam  Nursing note and vitals reviewed.  HENT:   Mouth/Throat: Moist mucous membranes.   Eyes: EOMI, nonicteric sclera  Cardiovascular: Normal rate, regular rhythm, no murmurs, rubs, or gallops  Pulmonary/Chest: Effort normal. RLL rhonchi noted.   Abdominal: Soft. Nontender, nondistended, no guarding or rigidity.   Musculoskeletal: Normal range of motion.   Neurological: Alert. Moves all extremities spontaneously.   Skin: Skin is warm and dry. No rash noted.         Diagnostics     Lab Results   Labs Ordered and Resulted from Time of ED Arrival to Time of ED Departure   COMPREHENSIVE METABOLIC PANEL - Abnormal       Result Value    Sodium 137      Potassium 4.1      Carbon Dioxide (CO2) 22      Anion Gap 13      Urea Nitrogen 15.0      Creatinine 1.19 (*)     GFR Estimate 58 (*)     Calcium 9.0      Chloride 102      Glucose 113 (*)     Alkaline Phosphatase 106      AST 27      ALT 17      Protein Total 7.0      Albumin 3.9      Bilirubin Total 0.9     CBC WITH PLATELETS AND DIFFERENTIAL - Abnormal    WBC Count 9.4      RBC Count 4.37 (*)     Hemoglobin 13.7      Hematocrit 41.8      MCV 96      MCH 31.4      MCHC 32.8      RDW 16.3 (*)     Platelet Count 135 (*)     % Neutrophils 79      % Lymphocytes 7      % Monocytes 13      % Eosinophils 0      % Basophils 0      % Immature Granulocytes 1      NRBCs per 100 WBC 0      Absolute Neutrophils 7.5      Absolute Lymphocytes 0.7 (*)     Absolute Monocytes 1.2      Absolute Eosinophils 0.0      Absolute Basophils 0.0      Absolute Immature Granulocytes 0.1      Absolute NRBCs 0.0     TROPONIN T, HIGH SENSITIVITY - Abnormal    Troponin T, High Sensitivity 38 (*)    NT PROBNP INPATIENT - Abnormal    N terminal Pro BNP Inpatient 4,609 (*)    TROPONIN T, HIGH SENSITIVITY - Abnormal    Troponin T, High Sensitivity 37 (*)    INFLUENZA " A/B, RSV, & SARS-COV2 PCR - Normal    Influenza A PCR Negative      Influenza B PCR Negative      RSV PCR Negative      SARS CoV2 PCR Negative     LACTIC ACID WHOLE BLOOD - Normal    Lactic Acid 1.5     BLOOD CULTURE       Imaging   Chest XR,  PA & LAT   Final Result   IMPRESSION: Since 9/22/2023 there has been a slight increase in interstitial lung markings most marked in the lung bases greatest on the right worrisome for underlying findings of an interstitial infiltrate. Otherwise the chest is stable with again seen    borderline hyperinflation of both lungs. Slightly tortuous and partially calcified thoracic aorta. Moderate degenerative changes most marked in the shoulders. Mild thoracolumbar spinal curvature.      Echocardiogram Complete    (Results Pending)       EKG   ECG results from 11/01/24   EKG 12-lead, tracing only     Value    Systolic Blood Pressure     Diastolic Blood Pressure     Ventricular Rate 65    Atrial Rate 122    DE Interval     QRS Duration 86        QTc 436    P Axis     R AXIS 66    T Axis 72    Interpretation ECG Atrial fibrillation  Abnormal ECG           Independent Interpretation   I independently reviewed the CXR. Bilateral interstitial infiltrates noted, greater on right.       ED Course      Medications Administered   Medications   lidocaine 1 % 0.1-1 mL (has no administration in time range)   lidocaine (LMX4) cream (has no administration in time range)   sodium chloride (PF) 0.9% PF flush 3 mL (3 mLs Intracatheter $Given 11/2/24 0240)   sodium chloride (PF) 0.9% PF flush 3 mL (has no administration in time range)   senna-docusate (SENOKOT-S/PERICOLACE) 8.6-50 MG per tablet 1 tablet (has no administration in time range)     Or   senna-docusate (SENOKOT-S/PERICOLACE) 8.6-50 MG per tablet 2 tablet (has no administration in time range)   calcium carbonate (TUMS) chewable tablet 1,000 mg (has no administration in time range)   Patient is already receiving anticoagulation with  heparin, enoxaparin (LOVENOX), warfarin (COUMADIN)  or other anticoagulant medication (has no administration in time range)   acetaminophen (TYLENOL) tablet 650 mg (has no administration in time range)     Or   acetaminophen (TYLENOL) Suppository 650 mg (has no administration in time range)   oxyCODONE IR (ROXICODONE) half-tab 2.5 mg (has no administration in time range)   ondansetron (ZOFRAN ODT) ODT tab 4 mg (has no administration in time range)     Or   ondansetron (ZOFRAN) injection 4 mg (has no administration in time range)   cefTRIAXone (ROCEPHIN) 1 g vial to attach to  mL bag for ADULTS or NS 50 mL bag for PEDS (has no administration in time range)   azithromycin (ZITHROMAX) tablet 250 mg (has no administration in time range)   predniSONE (DELTASONE) tablet 40 mg (40 mg Oral $Given 11/2/24 0241)   guaiFENesin (ROBITUSSIN) 20 mg/mL solution 200 mg (has no administration in time range)   ipratropium - albuterol 0.5 mg/2.5 mg/3 mL (DUONEB) neb solution 3 mL (has no administration in time range)   ipratropium - albuterol 0.5 mg/2.5 mg/3 mL (DUONEB) neb solution 3 mL (has no administration in time range)   metoprolol tartrate (LOPRESSOR) tablet 100 mg (has no administration in time range)   apixaban ANTICOAGULANT (ELIQUIS) tablet 5 mg (has no administration in time range)   naloxone (NARCAN) injection 0.2 mg (has no administration in time range)     Or   naloxone (NARCAN) injection 0.4 mg (has no administration in time range)     Or   naloxone (NARCAN) injection 0.2 mg (has no administration in time range)     Or   naloxone (NARCAN) injection 0.4 mg (has no administration in time range)   acetaminophen (TYLENOL) tablet 1,000 mg (1,000 mg Oral $Given 11/1/24 2102)   cefTRIAXone (ROCEPHIN) 1 g vial to attach to  mL bag for ADULTS or NS 50 mL bag for PEDS (0 g Intravenous Stopped 11/1/24 6574)   azithromycin (ZITHROMAX) 500 mg in  mL intermittent infusion (0 mg Intravenous Stopped 11/2/24 0049)    furosemide (LASIX) injection 40 mg (40 mg Intravenous $Given 11/2/24 1893)         Discussion of Management   Admitting Hospitalist, Dr. Morgan      Additional Documentation  None    Medical Decision Making / Diagnosis     CMS Diagnoses: None    MIPS       None    MDM   Crow Salinas is a 89 year old male who presents with several days of worsening dyspnea with associated URI symptoms including subjective fevers, chills, body aches, cough. Ddx includes pneumonia, bronchitis, other viral illness, CHF, COPD, among other etiologies. History & exam most closely match pneumonia, however CXR and BNP suggest possible heart failure. Suspect mixed picture. Pt hypoxic here in ED. Notably, pt has recent history of problems swallowing suggesting aspiration as possible cause. No evidence of sepsis/severe sepsis. Admission indicated for further eval/tx. Discussed with hospitalist service, Dr. Morgan, who accepts pt for admission. Pt/daughter in agreement with plan. All questions answered.     Disposition   The patient was admitted to the hospital.     Diagnosis     ICD-10-CM    1. Acute respiratory failure with hypoxia (H)  J96.01       2. Community acquired bacterial pneumonia  J15.9       3. Elevated brain natriuretic peptide (BNP) level  R79.89              Scribe Disclosure:  I, Chas Osorio, am serving as a scribe at 8:52 PM on 11/1/2024 to document services personally performed by Shaquille Blanca MD based on my observations and the provider's statements to me.        Shaquille Blanca MD  11/02/24 4068

## 2024-11-02 NOTE — PLAN OF CARE
"Temp: 97.3  F (36.3  C) Temp src: Oral BP: 99/55 Pulse: 58   Resp: 18 SpO2: 94 % O2 Device: None (Room air) Oxygen Delivery: (P) 1 LPM     Orientation:  A&O x4  VS: VSS  Pain:  Denies pain  Tele:  SR  Activity: SBA   Resp:  RA  GI:  Denies nausea and vomiting  : Voiding into urinal  Skin:  WDL  Lines: PIV SL  Diet: Regular  Plan: Home  Discharge: Pending      Problem: Adult Inpatient Plan of Care  Goal: Plan of Care Review  Description: The Plan of Care Review/Shift note should be completed every shift.  The Outcome Evaluation is a brief statement about your assessment that the patient is improving, declining, or no change.  This information will be displayed automatically on your shift  note.  Outcome: Progressing  Flowsheets (Taken 11/2/2024 1402)  Outcome Evaluation: RA. Up at sisi for urinal use.  Plan of Care Reviewed With: patient  Overall Patient Progress: improving  Goal: Patient-Specific Goal (Individualized)  Description: You can add care plan individualizations to a care plan. Examples of Individualization might be:  \"Parent requests to be called daily at 9am for status\", \"I have a hard time hearing out of my right ear\", or \"Do not touch me to wake me up as it startles  me\".  Outcome: Progressing  Goal: Absence of Hospital-Acquired Illness or Injury  Outcome: Progressing  Intervention: Identify and Manage Fall Risk  Recent Flowsheet Documentation  Taken 11/2/2024 1137 by Jenna Ferreira, RN  Safety Promotion/Fall Prevention: safety round/check completed  Intervention: Prevent Infection  Recent Flowsheet Documentation  Taken 11/2/2024 1137 by Jenna Ferreira RN  Infection Prevention:   hand hygiene promoted   single patient room provided  Goal: Optimal Comfort and Wellbeing  Outcome: Progressing  Goal: Readiness for Transition of Care  Outcome: Progressing       Goal Outcome Evaluation:      Plan of Care Reviewed With: patient    Overall Patient Progress: improvingOverall Patient Progress: " improving    Outcome Evaluation: RA. Up at sisi for urinal use.

## 2024-11-02 NOTE — H&P
[facility]    History and Physical - Hospitalist Service       Date of Admission:  11/1/2024    Assessment & Plan     Crow Salinas is a 89 year old male w/PMH severe COPD, nicotine depencence, afib on DOAC, diastolic HF, HTN, DLD, CKD, pulmonary HTN, AAA, chronic back pain with narcotic dependence who presents from home with sob, cough and suspected to have CHF, CAP, COPD    Acute hypoxic respiratory failure  Suspected acute on chronic diastolic HF  Suspected COPD exacerbation  Possible CAP  -presents with somewhat productive cough, sob past couple days after flu shot 5 days ago. Borderline fever, reported chills.  -in ED no leukocytosis, tmax 99.9 w/elevated BNP and CXR c/w volume overload and some mild edema in legs  -likely multifactorial, is not on diuretics, lasix or nebs/inhalers as far as I can tell and still smoking  -started on rocephin and azithro in ED, will cont for now and check procal and viral resp panel as suspect could be viral etiology (initial limited viral panel neg)  -give lasix 40 IV x1 w/strict I/O's and check ECHO  -trial of prednisone, sched and PRN nebs. Recommend complete alcohol cessation  -wean off 2L NC    Detectable troponin  -38 then 37, no CP. Doubt ACS but I don't seen EKG so will order  -ECHO as above    CKD  -per cardiology note from [facility] baseline Cr usually around 1.3, Cr is 1.19 on admission possibly indicating some fluid overload  -serial labs w/diuresis    Hx afib, severe COPD, nicotine dependence, HTN, AAA, chronic back pain and narcotic dependence  -on metoprolol 100 BID and eliquis at home so will resume  -reports being weaned off oxy, currently on 2.5mg BID PRN so reordered  -await med rec for rest of his home meds, [facility]     Diet:  regular  DVT Prophylaxis: DOAC  Mckeon Catheter: Not present  Lines: None     Cardiac Monitoring: None  Code Status:  full code, confirmed with patient       Dez Morgan DO  Hospitalist  [facility]  Securely message with Jerald (more info)  Text page via [facility] Paging/Directory     ______________________________________________________________________    Chief Complaint   Sob, cough    History of Present Illness   Crow Salinas is a 89 year old male w/PMH severe COPD, nicotine depencence, afib on DOAC, diastolic HF, HTN, DLD, CKD, pulmonary HTN, AAA, chronic back pain with narcotic dependence who presents from home with sob, cough. He got his flu vaccine about 5 days ago and had been doing well until past 1-2 days when he developed sob, cough productive of white sputum. Some chills but no recorded fever at home. No CP, does have some orthopnea. Denies any LE edema but small amount noted on exam. Not on oxygen or diuretics at home, still smokes occasionally.    In ED had BNP of 4609 w/congested appearing CXR. No leukocytosis, tmax of 99.9. Troponin 38 then 37, I don't seen and EKG done. Was thought to have CAP and given Azithro and rocephin, viral panel negative. Currently on 2L and satting well      Past Medical History    Past Medical History:   Diagnosis Date    A-fib (H)        Past Surgical History   No past surgical history on file.    Prior to Admission Medications   None        Review of Systems    The 10 point Review of Systems is negative other than noted in the HPI or here.     Social History   I have reviewed this patient's social history and updated it with pertinent information if needed.         Family History   No significant family history reported      Allergies   Allergies   Allergen Reactions    Aspirin Other (See Comments)     Other reaction(s): FEELING OF WARMTH  Feeling of warmth      Etodolac Other (See Comments)        Physical Exam   Vital Signs: Temp: 99.9  F (37.7  C) Temp src: Temporal BP: 120/70 Pulse: 90   Resp: 24 SpO2: 96 % O2 Device: Nasal cannula Oxygen Delivery: 2 LPM  Weight: 153 lbs 3.52 oz    Constitutional: awake, alert, and  cooperative  Eyes: pupils equal, round and reactive to light and conjunctiva normal  ENT: normocephalic, without obvious abnormality, atraumatic  Respiratory: diffusely diminished air entry bilaterally, no clear wheezing or crackles, frequent coughing that's mostly non productive  Cardiovascular: irregularly irregular rhythm, no murmur noted, +1 edema bilateral LE  GI: normal bowel sounds, soft, and non-distended  Skin: no bruising or bleeding  Neurologic: alert, interactive, no focal deficits but generally weak appearing      65 MINUTES SPENT BY ME on the date of service doing chart review, history, exam, documentation & further activities per the note.      Data   ------------------------- PAST 24 HR DATA REVIEWED -----------------------------------------------    I have personally reviewed the following data over the past 24 hrs:    9.4  \   13.7   / 135 (L)     137 102 15.0 /  113 (H)   4.1 22 1.19 (H) \     ALT: 17 AST: 27 AP: 106 TBILI: 0.9   ALB: 3.9 TOT PROTEIN: 7.0 LIPASE: N/A     Trop: 37 (H) BNP: 4,609 (H)     Procal: N/A CRP: N/A Lactic Acid: 1.5         Imaging results reviewed over the past 24 hrs:   Recent Results (from the past 24 hours)   Chest XR,  PA & LAT    Narrative    EXAM: XR CHEST 2 VIEWS  LOCATION: [facility]  DATE: 11/1/2024    INDICATION: fever, RLL rhonchi  COMPARISON: 9/22/2023      Impression    IMPRESSION: Since 9/22/2023 there has been a slight increase in interstitial lung markings most marked in the lung bases greatest on the right worrisome for underlying findings of an interstitial infiltrate. Otherwise the chest is stable with again seen   borderline hyperinflation of both lungs. Slightly tortuous and partially calcified thoracic aorta. Moderate degenerative changes most marked in the shoulders. Mild thoracolumbar spinal curvature.

## 2024-11-02 NOTE — PROGRESS NOTES
[facility]    Medicine Progress Note - Hospitalist Service    Date of Admission:  11/1/2024    Assessment & Plan     Corw Salinas is a 89 year old male w/PMH severe COPD, nicotine depencence, afib on DOAC, diastolic HF, HTN, DLD, CKD, pulmonary HTN, AAA, chronic back pain with narcotic dependence who presents from home with sob, cough and suspected to have CHF, CAP, COPD.    Human rhinovirus/enterovirus infection.  Acute hypoxic respiratory failure.  Possible secondary bacterial pneumonia.  Acute COPD exacerbation.  -Supplemental oxygen as needed.  -Continue prednisone 40 mg a day.  -Continue azithromycin for a 5-day course.  -Continue ceftriaxone 1 g IV every 24 hours.  -Continue scheduled DuoNebs.  -Additional albuterol if needed.    Acute exacerbation of chronic heart failure with preserved ejection fraction.  -Initially requiring IV furosemide for diuresis.  -Fluid status appears improved.  -Hold off on further diuresis for now.  -Likely restart oral furosemide tomorrow.    -Weigh daily.    -Intake and output monitoring.    Tobacco use disorder.  -Nicotine gum if needed.    Atrial fibrillation.  Drug-induced coagulation defect.  Sinus pauses.  Hypertension.  -Decrease metoprolol to 50 mg twice a day.  -Continue to monitor on telemetry.  -Continue apixaban 5 mg twice a day.    Chronic kidney disease stage IIIa.  -Creatinine at baseline.  -Avoid nephrotoxins as able.  -Recheck metabolic panel tomorrow.    Diarrhea.  -Suspect related to rhinovirus/enterovirus infection.  -Check C. difficile toxin.          Diet: Combination Diet Regular Diet Adult    DVT Prophylaxis: DOAC  Mckeon Catheter: Not present  Lines: None     Cardiac Monitoring: ACTIVE order. Indication: Acute decompensated heart failure (48 hours)  Code Status: Full Code      Clinically Significant Risk Factors Present on Admission           # Hypocalcemia: Lowest Ca = 8.5 mg/dL in last 2 days, will monitor and replace as  appropriate       # Thrombocytopenia: Lowest platelets = 126 in last 2 days, will monitor for bleeding   # Hypertension: Noted on problem list   # Acute Hypoxic Respiratory Failure: Documented O2 saturation < 90%. Continue supplemental oxygen as needed                   Disposition Plan     Medically Ready for Discharge: Anticipated in 2-4 Days             Raul Pagan DO  Hospitalist Service  [facility]  Securely message with Room n House (more info)  Text page via [email or web address] Paging/Directory   ______________________________________________________________________    Interval History   Short of breath.  Coughing.  Having diarrhea.  Denies chest pain, fevers, chills, nausea, vomiting.    Physical Exam   Vital Signs: Temp: 97.3  F (36.3  C) Temp src: Oral BP: 99/55 Pulse: 58   Resp: 18 SpO2: 94 % O2 Device: None (Room air) Oxygen Delivery: (P) 1 LPM  Weight: 152 lbs 12.8 oz    Gen:  NAD, A&O seemingly x3.  Eyes:  PERRL, sclera anicteric.  OP:  MMM, no lesions.  Neck:  Supple.  CV:  Irregular, no murmurs.  Lung:  CTA b/l, normal effort.  Ab:  +BS, soft.  Skin:  Warm, dry to touch.  No rash.  Ext:  1+ pitting edema LE b/l.      Medical Decision Making       52 MINUTES SPENT BY ME on the date of service doing chart review, history, exam, documentation & further activities per the note.      Data     I have personally reviewed the following data over the past 24 hrs:    6.6  \   13.9   / 126 (L)     140 101 17.8 /  125 (H)   4.0 24 1.26 (H) \     ALT: 17 AST: 27 AP: 106 TBILI: 0.9   ALB: 3.9 TOT PROTEIN: 7.0 LIPASE: N/A     Trop: 37 (H) BNP: 4,609 (H)     Procal: 0.09 CRP: N/A Lactic Acid: 1.5         Imaging results reviewed over the past 24 hrs:   Recent Results (from the past 24 hours)   Chest XR,  PA & LAT    Narrative    EXAM: XR CHEST 2 VIEWS  LOCATION: [facility]  DATE: 11/1/2024    INDICATION: fever, RLL rhonchi  COMPARISON: 9/22/2023      Impression    IMPRESSION: Since  2023 there has been a slight increase in interstitial lung markings most marked in the lung bases greatest on the right worrisome for underlying findings of an interstitial infiltrate. Otherwise the chest is stable with again seen   borderline hyperinflation of both lungs. Slightly tortuous and partially calcified thoracic aorta. Moderate degenerative changes most marked in the shoulders. Mild thoracolumbar spinal curvature.   Echocardiogram Complete   Result Value    LVEF  65%    [facility]    [id number]  [id number]  [id number]  [id number]^FRANK^GIBRAN^FREDY     [facility]  Echocardiography Laboratory  [address] [place] [address]     Name: CELINE PRICE  MRN: [id number]  : 1935  Study Date: 2024 10:11 AM  Age: 89 yrs  Gender: Male  Patient Location: [facility]  Reason For Study: CHF  Ordering Physician: GIBRAN MARIE  Performed By: JUDY Polo     BSA: 1.9 m2  Height: 71 in  Weight: 152 lb  HR: 80  BP: 130/66 mmHg  ______________________________________________________________________________  Procedure  Complete Portable Echo Adult.  ______________________________________________________________________________  Interpretation Summary     1. The left ventricle is normal in structure, function and size. The visual  ejection fraction is estimated at 65%.  2. The right ventricle is normal in structure, function and size.  3. No valve disease.     No previous echo for comparison.  ______________________________________________________________________________  Left Ventricle  The left ventricle is normal in structure, function and size. There is normal  left ventricular wall thickness. The visual ejection fraction is estimated at  65%. Diastolic function not assessed due to atrial fibrillation. Normal left  ventricular wall motion.     Right Ventricle  The right ventricle is normal in structure, function and size.     Atria  There is moderate biatrial enlargement. There is no  atrial shunt seen.     Mitral Valve  There is trace to mild mitral regurgitation.     Tricuspid Valve  There is mild (1+) tricuspid regurgitation.     Aortic Valve  The aortic valve is normal in structure and function.     Pulmonic Valve  The pulmonic valve is normal in structure and function.     Vessels  Normal ascending, transverse (arch), and descending aorta. The inferior vena  cava was normal in size with preserved respiratory variability.     Pericardium  There is no pericardial effusion.     Rhythm  The rhythm was atrial fibrillation.  ______________________________________________________________________________  MMode/2D Measurements & Calculations     IVSd: 0.73 cm  LVIDd: 4.1 cm  LVIDs: 3.2 cm  LVPWd: 0.72 cm  FS: 23.2 %  LV mass(C)d: 87.2 grams  LV mass(C)dI: 46.4 grams/m2  Ao root diam: 3.0 cm  LVOT diam: 2.0 cm  LVOT area: 3.2 cm2  Ao root diam index Ht(cm/m): 1.7  Ao root diam index BSA (cm/m2): 1.6  LA Volume (BP): 94.4 ml  LA Volume Index (BP): 50.2 ml/m2     RV Base: 3.8 cm  RWT: 0.35  TAPSE: 1.8 cm     Doppler Measurements & Calculations  MV max PG: 3.0 mmHg  MV mean P.1 mmHg  MV V2 VTI: 20.7 cm  PA V2 max: 79.7 cm/sec  PA max P.6 mmHg  PA acc time: 0.09 sec  TR max seven: 249.5 cm/sec  TR max P.9 mmHg  RV S Seven: 13.5 cm/sec     ______________________________________________________________________________  Report approved by: Claude Nicole 2024 11:42 AM

## 2024-11-02 NOTE — PLAN OF CARE
"/66 (BP Location: Left arm, Patient Position: Supine, Cuff Size: Adult Regular)   Pulse 74   Temp 97.8  F (36.6  C) (Oral)   Resp 18   Ht 1.803 m (5' 11\")   Wt 69.3 kg (152 lb 12.8 oz)   SpO2 92%   BMI 21.31 kg/m      VSS on 1 L via NC. Attempted to wean to RA for a short while, but pt unable to maintain sats after ambulating to bathroom. Denies pain. PIV SL. Aox4. [place]. LS: dim and coarse. Reports CLAIRE. Tele: A fib CVR. Denies CP. No edema noted. Numbness and tingling to BLE; BL per pt. Voiding spontaneously w/ urinal. Regular diet. SBA w/ GB. Plan: IV Rocephin. Wean O2. IV Lasix x1. Strict I/O. Continue w/ POC.     Goal Outcome Evaluation:      Plan of Care Reviewed With: patient    Overall Patient Progress: improvingOverall Patient Progress: improving    Outcome Evaluation: Sating well on RA. No edema noted. BP improving.    Problem: Adult Inpatient Plan of Care  Goal: Plan of Care Review  Description: The Plan of Care Review/Shift note should be completed every shift.  The Outcome Evaluation is a brief statement about your assessment that the patient is improving, declining, or no change.  This information will be displayed automatically on your shift  note.  11/2/2024 0300 by Sumaya Ramsey, RN  Outcome: Progressing  11/2/2024 0257 by Sumaya Ramsey, RN  Outcome: Progressing  Flowsheets (Taken 11/2/2024 0257)  Outcome Evaluation: Sating well on RA. No edema noted. BP improving.  Plan of Care Reviewed With: patient  Overall Patient Progress: improving  Goal: Patient-Specific Goal (Individualized)  Description: You can add care plan individualizations to a care plan. Examples of Individualization might be:  \"Parent requests to be called daily at 9am for status\", \"I have a hard time hearing out of my right ear\", or \"Do not touch me to wake me up as it startles  me\".  11/2/2024 0300 by Sumaya Ramsey, RN  Outcome: Progressing  11/2/2024 0257 by Sumaya Ramsey, RN  Outcome: Progressing  Goal: Absence " of Hospital-Acquired Illness or Injury  11/2/2024 0300 by Sumaya Ramsey RN  Outcome: Progressing  11/2/2024 0257 by Sumaya Ramsey RN  Outcome: Progressing  Intervention: Identify and Manage Fall Risk  Recent Flowsheet Documentation  Taken 11/2/2024 0227 by Sumaya Ramsey RN  Safety Promotion/Fall Prevention: safety round/check completed  Intervention: Prevent Skin Injury  Recent Flowsheet Documentation  Taken 11/2/2024 0255 by Sumaya Ramsey RN  Body Position: position changed independently  Taken 11/2/2024 0152 by Sumaya Ramsey RN  Body Position: position changed independently  Intervention: Prevent and Manage VTE (Venous Thromboembolism) Risk  Recent Flowsheet Documentation  Taken 11/2/2024 0227 by Sumaya Ramsey RN  VTE Prevention/Management:   SCDs off (sequential compression devices)   patient refused intervention  Intervention: Prevent Infection  Recent Flowsheet Documentation  Taken 11/2/2024 0227 by Sumaya Ramsey RN  Infection Prevention:   single patient room provided   rest/sleep promoted   personal protective equipment utilized   hand hygiene promoted   equipment surfaces disinfected   environmental surveillance performed   cohorting utilized  Goal: Optimal Comfort and Wellbeing  11/2/2024 0300 by Sumaya Ramsey RN  Outcome: Progressing  11/2/2024 0257 by Sumaya Ramsey RN  Outcome: Progressing  Goal: Readiness for Transition of Care  11/2/2024 0300 by Sumaya Ramsey RN  Outcome: Progressing  11/2/2024 0257 by Sumaya Ramsey RN  Outcome: Progressing  Intervention: Mutually Develop Transition Plan  Recent Flowsheet Documentation  Taken 11/2/2024 0159 by Sumaya Ramsey RN  Equipment Currently Used at Home: none     Problem: ARDS (Acute Respiratory Distress Syndrome)  Goal: Effective Oxygenation  11/2/2024 0300 by Sumaya Ramsey RN  Outcome: Progressing  11/2/2024 0257 by Sumaya Ramsey RN  Outcome: Progressing  Intervention: Optimize Oxygenation, Ventilation and  Perfusion  Recent Flowsheet Documentation  Taken 11/2/2024 0255 by Sumaya Ramsey RN  Head of Bed (HOB) Positioning: HOB at 15 degrees  Taken 11/2/2024 0152 by Sumaya Ramsey RN  Head of Bed (HOB) Positioning: HOB at 20 degrees     Problem: Comorbidity Management  Goal: Maintenance of COPD Symptom Control  11/2/2024 0300 by Sumaya Ramsey RN  Outcome: Progressing  11/2/2024 0257 by Sumaya Ramsey RN  Outcome: Progressing  Intervention: Maintain COPD Symptom Control  Recent Flowsheet Documentation  Taken 11/2/2024 0227 by Sumaya Ramsey RN  Medication Review/Management: medications reviewed  Goal: Maintenance of Heart Failure Symptom Control  11/2/2024 0300 by Sumaya Ramsey RN  Outcome: Progressing  11/2/2024 0257 by Sumaya Ramsey RN  Outcome: Progressing  Intervention: Maintain Heart Failure Management  Recent Flowsheet Documentation  Taken 11/2/2024 0227 by Sumaya Ramsey RN  Medication Review/Management: medications reviewed  Goal: Blood Pressure in Desired Range  11/2/2024 0300 by Sumaya Ramsey RN  Outcome: Progressing  11/2/2024 0257 by Sumaya Ramsey RN  Outcome: Progressing  Intervention: Maintain Blood Pressure Management  Recent Flowsheet Documentation  Taken 11/2/2024 0227 by Sumaya Ramsey RN  Medication Review/Management: medications reviewed     Problem: Skin Injury Risk Increased  Goal: Skin Health and Integrity  11/2/2024 0300 by Sumaya Ramsey RN  Outcome: Progressing  11/2/2024 0257 by Sumaya Ramsey RN  Outcome: Progressing  Intervention: Optimize Skin Protection  Recent Flowsheet Documentation  Taken 11/2/2024 0255 by Sumaya Ramsey RN  Activity Management:   ambulated to bathroom   back to bed  Head of Bed (HOB) Positioning: HOB at 15 degrees  Taken 11/2/2024 0152 by Sumaya Ramsey, RN  Activity Management: activity adjusted per tolerance  Head of Bed (HOB) Positioning: HOB at 20 degrees  Taken 11/2/2024 0148 by Sumaya Ramsey RN  Activity Management: ambulated to  bathroom

## 2024-11-02 NOTE — PROVIDER NOTIFICATION
11/02/24 1230   RCAT Assessment   Reason for Assessment COPD   Pulmonary Status 4   Surgical Status 0   Chest X-ray 3   Respiratory Pattern 2   Mental Status 0   Breath Sounds 2   Cough Effectiveness 0   Level of Activity 1   O2 Required for SpO2>=92% 0   Acuity Level (points) 12   Acuity Level  3   Re-eval Interval Guideline Every 3 days   Re-evaluation Date 11/05/24   Clinical Indications/Symptoms   Aerosol Therapy History of bronchospasm;Home regimen;Physician order;RCAT protocol;Bronchospasm   Broncho-pulmonary Hygiene History of mucous producing disease   Volume Expansion Prevent atelectasis;Decreased breath sounds;Atelectasis   Aerosol Therapy Plan   RT Treatment Nebulizer   Anticholinergic/Beta-Andrenergic Agonist Duoneb soln (0.5mg/3mg per 3mL) neb Max 6 doses/24h   Aerosol Treatment Frequency Acuity Level 3: QID/PRN @noc-Mod wheezing/Hx asthma/secretion removal   Aerosol Therapy (SVN)   Daily Review of Necessity (SVN) completed   Respiratory Treatment Status (SVN) given   Patient Position HOB elevated;supine   Posttreatment Assessment (SVN) breath sounds unchanged   Signs of Intolerance (SVN) none   Broncho-Pulmonary Hygiene Plan   Broncho-Pulmonary Hygiene Treatment Coughing techniques   Broncho-Pulm Hygiene Frequency Acuity Level 3: TID-Small amounts secretions/poor cough, hx of secretions   Volume Expansion Plan   Volume Expansion Treatment Incentive Spirometer   Volume Expansion Frequency Acuity Level 3: TID-At risk for developing atelectasis   Breath Sounds   Breath Sounds All Fields   All Lung Fields Breath Sounds diminished

## 2024-11-02 NOTE — ED NOTES
"[facility]  ED Nurse Handoff Report    ED Chief complaint: Generalized Weakness and Shortness of Breath  . ED Diagnosis:   Final diagnoses:   None       Allergies:   Allergies   Allergen Reactions    Aspirin Other (See Comments)     Other reaction(s): FEELING OF WARMTH  Feeling of warmth      Etodolac Other (See Comments)       Code Status: unknown    Activity level - Baseline/Home:  independent.  Activity Level - Current:   assist of 1.   Lift room needed: No.   Bariatric: No   Needed: No   Isolation: No.   Infection: Not Applicable.     Respiratory status: Nasal cannula    Vital Signs (within 30 minutes):   Vitals:    11/01/24 2030 11/01/24 2107 11/01/24 2115   BP: (!) 124/90 139/76 129/75   Pulse: 98 83 91   Resp: 24     Temp: 99.9  F (37.7  C)     TempSrc: Temporal     SpO2: (!) 88% 96%    Weight: 69.5 kg (153 lb 3.5 oz)     Height: 1.803 m (5' 11\")         Cardiac Rhythm:  ,      Pain level:    Patient confused: No.   Patient Falls Risk: patient and family education.   Elimination Status:  has not voided      Patient Report - Initial Complaint: Pt reports generalized weakness, shortness of breath and increased urination since yesterday day afternoon. Pt nausea. Pt has productive cough     Focused Assessment: A&Ox4. [place]. VSS on 2L NC. Desats to 88% on RA. C/O SOB with exertion. PIV on L forearm. ABX given. Denies pain, CP.      Abnormal Results:   Labs Ordered and Resulted from Time of ED Arrival to Time of ED Departure   COMPREHENSIVE METABOLIC PANEL - Abnormal       Result Value    Sodium 137      Potassium 4.1      Carbon Dioxide (CO2) 22      Anion Gap 13      Urea Nitrogen 15.0      Creatinine 1.19 (*)     GFR Estimate 58 (*)     Calcium 9.0      Chloride 102      Glucose 113 (*)     Alkaline Phosphatase 106      AST 27      ALT 17      Protein Total 7.0      Albumin 3.9      Bilirubin Total 0.9     CBC WITH PLATELETS AND DIFFERENTIAL - Abnormal    WBC Count 9.4      RBC " Count 4.37 (*)     Hemoglobin 13.7      Hematocrit 41.8      MCV 96      MCH 31.4      MCHC 32.8      RDW 16.3 (*)     Platelet Count 135 (*)     % Neutrophils 79      % Lymphocytes 7      % Monocytes 13      % Eosinophils 0      % Basophils 0      % Immature Granulocytes 1      NRBCs per 100 WBC 0      Absolute Neutrophils 7.5      Absolute Lymphocytes 0.7 (*)     Absolute Monocytes 1.2      Absolute Eosinophils 0.0      Absolute Basophils 0.0      Absolute Immature Granulocytes 0.1      Absolute NRBCs 0.0     TROPONIN T, HIGH SENSITIVITY - Abnormal    Troponin T, High Sensitivity 38 (*)    NT PROBNP INPATIENT - Abnormal    N terminal Pro BNP Inpatient 4,609 (*)    INFLUENZA A/B, RSV, & SARS-COV2 PCR - Normal    Influenza A PCR Negative      Influenza B PCR Negative      RSV PCR Negative      SARS CoV2 PCR Negative     LACTIC ACID WHOLE BLOOD - Normal    Lactic Acid 1.5     TROPONIN T, HIGH SENSITIVITY   BLOOD CULTURE        Chest XR,  PA & LAT   Final Result   IMPRESSION: Since 9/22/2023 there has been a slight increase in interstitial lung markings most marked in the lung bases greatest on the right worrisome for underlying findings of an interstitial infiltrate. Otherwise the chest is stable with again seen    borderline hyperinflation of both lungs. Slightly tortuous and partially calcified thoracic aorta. Moderate degenerative changes most marked in the shoulders. Mild thoracolumbar spinal curvature.          Treatments provided: see imaging, MAR, and labs  Family Comments: daughter w/ pt  OBS brochure/video discussed/provided to patient:  No  ED Medications:   Medications   azithromycin (ZITHROMAX) 500 mg in  mL intermittent infusion (has no administration in time range)   acetaminophen (TYLENOL) tablet 1,000 mg (1,000 mg Oral $Given 11/1/24 2102)   cefTRIAXone (ROCEPHIN) 1 g vial to attach to  mL bag for ADULTS or NS 50 mL bag for PEDS (1 g Intravenous $New Bag 11/1/24 2302)       Drips infusing:   No  For the majority of the shift this patient was [place].   Interventions performed were n/a.    Sepsis treatment initiated: No    Cares/treatment/interventions/medications to be completed following ED care: see in-pt orders    ED Nurse Name: Rashmi Morgan RN  11:20 PM    RECEIVING UNIT ED HANDOFF REVIEW    Above ED Nurse Handoff Report was reviewed: Yes  Reviewed by: Sumaya Ramsey RN on November 2, 2024 at 12:26 AM   I Vocera called the ED to inform them the note was read: Yes

## 2024-11-03 ENCOUNTER — APPOINTMENT (OUTPATIENT)
Dept: PHYSICAL THERAPY | Facility: CLINIC | Age: 89
DRG: 291 | End: 2024-11-03
Attending: INTERNAL MEDICINE
Payer: COMMERCIAL

## 2024-11-03 LAB
ANION GAP SERPL CALCULATED.3IONS-SCNC: 13 MMOL/L (ref 7–15)
BUN SERPL-MCNC: 28.9 MG/DL (ref 8–23)
CALCIUM SERPL-MCNC: 8.5 MG/DL (ref 8.8–10.4)
CHLORIDE SERPL-SCNC: 102 MMOL/L (ref 98–107)
CREAT SERPL-MCNC: 1.35 MG/DL (ref 0.67–1.17)
EGFRCR SERPLBLD CKD-EPI 2021: 50 ML/MIN/1.73M2
ERYTHROCYTE [DISTWIDTH] IN BLOOD BY AUTOMATED COUNT: 16.3 % (ref 10–15)
GLUCOSE SERPL-MCNC: 198 MG/DL (ref 70–99)
HCO3 SERPL-SCNC: 24 MMOL/L (ref 22–29)
HCT VFR BLD AUTO: 36.9 % (ref 40–53)
HGB BLD-MCNC: 12.1 G/DL (ref 13.3–17.7)
MCH RBC QN AUTO: 31 PG (ref 26.5–33)
MCHC RBC AUTO-ENTMCNC: 32.8 G/DL (ref 31.5–36.5)
MCV RBC AUTO: 95 FL (ref 78–100)
PLATELET # BLD AUTO: 129 10E3/UL (ref 150–450)
POTASSIUM SERPL-SCNC: 4.2 MMOL/L (ref 3.4–5.3)
RBC # BLD AUTO: 3.9 10E6/UL (ref 4.4–5.9)
SODIUM SERPL-SCNC: 139 MMOL/L (ref 135–145)
WBC # BLD AUTO: 7.4 10E3/UL (ref 4–11)

## 2024-11-03 PROCEDURE — 80048 BASIC METABOLIC PNL TOTAL CA: CPT | Performed by: INTERNAL MEDICINE

## 2024-11-03 PROCEDURE — 250N000012 HC RX MED GY IP 250 OP 636 PS 637: Performed by: HOSPITALIST

## 2024-11-03 PROCEDURE — 36415 COLL VENOUS BLD VENIPUNCTURE: CPT | Performed by: INTERNAL MEDICINE

## 2024-11-03 PROCEDURE — 94640 AIRWAY INHALATION TREATMENT: CPT | Mod: 76

## 2024-11-03 PROCEDURE — 120N000001 HC R&B MED SURG/OB

## 2024-11-03 PROCEDURE — 82565 ASSAY OF CREATININE: CPT | Performed by: INTERNAL MEDICINE

## 2024-11-03 PROCEDURE — 250N000009 HC RX 250: Performed by: INTERNAL MEDICINE

## 2024-11-03 PROCEDURE — 250N000013 HC RX MED GY IP 250 OP 250 PS 637: Performed by: HOSPITALIST

## 2024-11-03 PROCEDURE — 250N000013 HC RX MED GY IP 250 OP 250 PS 637: Performed by: INTERNAL MEDICINE

## 2024-11-03 PROCEDURE — 85018 HEMOGLOBIN: CPT | Performed by: INTERNAL MEDICINE

## 2024-11-03 PROCEDURE — 999N000157 HC STATISTIC RCP TIME EA 10 MIN

## 2024-11-03 PROCEDURE — 99232 SBSQ HOSP IP/OBS MODERATE 35: CPT | Performed by: INTERNAL MEDICINE

## 2024-11-03 PROCEDURE — 85048 AUTOMATED LEUKOCYTE COUNT: CPT | Performed by: INTERNAL MEDICINE

## 2024-11-03 PROCEDURE — 250N000011 HC RX IP 250 OP 636: Performed by: HOSPITALIST

## 2024-11-03 PROCEDURE — 82435 ASSAY OF BLOOD CHLORIDE: CPT | Performed by: INTERNAL MEDICINE

## 2024-11-03 PROCEDURE — 97161 PT EVAL LOW COMPLEX 20 MIN: CPT | Mod: GP | Performed by: PHYSICAL THERAPIST

## 2024-11-03 RX ORDER — OXYCODONE AND ACETAMINOPHEN 5; 325 MG/1; MG/1
.25-.5 TABLET ORAL 2 TIMES DAILY PRN
Status: ON HOLD | COMMUNITY
End: 2024-11-04

## 2024-11-03 RX ORDER — DOCUSATE SODIUM 100 MG/1
100 CAPSULE, LIQUID FILLED ORAL DAILY
COMMUNITY

## 2024-11-03 RX ORDER — CYCLOBENZAPRINE HCL 10 MG
10 TABLET ORAL 3 TIMES DAILY PRN
COMMUNITY

## 2024-11-03 RX ORDER — METOPROLOL TARTRATE 100 MG/1
100 TABLET ORAL 2 TIMES DAILY
Status: ON HOLD | COMMUNITY
End: 2024-11-04

## 2024-11-03 RX ORDER — FUROSEMIDE 20 MG/1
20 TABLET ORAL ONCE
Status: COMPLETED | OUTPATIENT
Start: 2024-11-03 | End: 2024-11-03

## 2024-11-03 RX ORDER — AMLODIPINE BESYLATE 10 MG/1
10 TABLET ORAL DAILY
COMMUNITY

## 2024-11-03 RX ORDER — ACETAMINOPHEN 500 MG
500 TABLET ORAL EVERY 4 HOURS PRN
COMMUNITY

## 2024-11-03 RX ORDER — SILDENAFIL 100 MG/1
100 TABLET, FILM COATED ORAL DAILY PRN
Status: ON HOLD | COMMUNITY
End: 2024-11-04

## 2024-11-03 RX ORDER — SIMVASTATIN 40 MG
20 TABLET ORAL AT BEDTIME
Status: ON HOLD | COMMUNITY
End: 2024-11-03

## 2024-11-03 RX ORDER — ATORVASTATIN CALCIUM 10 MG/1
10 TABLET, FILM COATED ORAL DAILY
COMMUNITY

## 2024-11-03 RX ORDER — ALBUTEROL SULFATE 0.83 MG/ML
2.5 SOLUTION RESPIRATORY (INHALATION) EVERY 6 HOURS PRN
COMMUNITY

## 2024-11-03 RX ORDER — GLIPIZIDE 2.5 MG/1
2.5-5 TABLET ORAL 2 TIMES DAILY
Status: ON HOLD | COMMUNITY
End: 2024-11-03

## 2024-11-03 RX ORDER — ASPIRIN 81 MG/1
81 TABLET ORAL DAILY
Status: ON HOLD | COMMUNITY
End: 2024-11-03

## 2024-11-03 RX ORDER — LOSARTAN POTASSIUM 100 MG/1
100 TABLET ORAL DAILY
COMMUNITY

## 2024-11-03 RX ORDER — MULTIVITAMIN
1 TABLET ORAL DAILY
COMMUNITY

## 2024-11-03 RX ORDER — LANOLIN ALCOHOL/MO/W.PET/CERES
1000 CREAM (GRAM) TOPICAL DAILY
COMMUNITY

## 2024-11-03 RX ADMIN — IPRATROPIUM BROMIDE AND ALBUTEROL SULFATE 3 ML: .5; 3 SOLUTION RESPIRATORY (INHALATION) at 15:48

## 2024-11-03 RX ADMIN — APIXABAN 5 MG: 5 TABLET, FILM COATED ORAL at 21:32

## 2024-11-03 RX ADMIN — IPRATROPIUM BROMIDE AND ALBUTEROL SULFATE 3 ML: .5; 3 SOLUTION RESPIRATORY (INHALATION) at 19:35

## 2024-11-03 RX ADMIN — Medication 1 LOZENGE: at 18:08

## 2024-11-03 RX ADMIN — Medication 5 MG: at 22:10

## 2024-11-03 RX ADMIN — IPRATROPIUM BROMIDE AND ALBUTEROL SULFATE 3 ML: .5; 3 SOLUTION RESPIRATORY (INHALATION) at 08:38

## 2024-11-03 RX ADMIN — METOPROLOL TARTRATE 50 MG: 50 TABLET, FILM COATED ORAL at 08:38

## 2024-11-03 RX ADMIN — IPRATROPIUM BROMIDE AND ALBUTEROL SULFATE 3 ML: .5; 3 SOLUTION RESPIRATORY (INHALATION) at 11:21

## 2024-11-03 RX ADMIN — Medication 1 LOZENGE: at 21:32

## 2024-11-03 RX ADMIN — CEFTRIAXONE 1 G: 1 INJECTION, POWDER, FOR SOLUTION INTRAMUSCULAR; INTRAVENOUS at 21:32

## 2024-11-03 RX ADMIN — PREDNISONE 40 MG: 20 TABLET ORAL at 08:38

## 2024-11-03 RX ADMIN — AZITHROMYCIN DIHYDRATE 250 MG: 250 TABLET ORAL at 21:32

## 2024-11-03 RX ADMIN — APIXABAN 5 MG: 5 TABLET, FILM COATED ORAL at 08:38

## 2024-11-03 RX ADMIN — METOPROLOL TARTRATE 50 MG: 50 TABLET, FILM COATED ORAL at 21:32

## 2024-11-03 RX ADMIN — FUROSEMIDE 20 MG: 20 TABLET ORAL at 12:47

## 2024-11-03 ASSESSMENT — ACTIVITIES OF DAILY LIVING (ADL)
ADLS_ACUITY_SCORE: 0
DEPENDENT_IADLS:: INDEPENDENT
ADLS_ACUITY_SCORE: 0

## 2024-11-03 NOTE — CONSULTS
Care Management Initial Consult    General Information  Assessment completed with: Patient,    Type of CM/SW Visit: Initial Assessment    Primary Care Provider verified and updated as needed: Yes   Readmission within the last 30 days: no previous admission in last 30 days      Reason for Consult: discharge planning       Communication Assessment  Patient's communication style: spoken language (English or Bilingual)    Hearing Difficulty or Deaf: yes   Wear Glasses or Blind: no    Cognitive  Cognitive/Neuro/Behavioral: WDL                      Living Environment:   People in home: spouse     Current living Arrangements: independent living facility      Able to return to prior arrangements: yes     Family/Social Support:  Care provided by: self  Provides care for: spouse  Marital Status:   Support system: Children              Current Resources:   Patient receiving home care services: No  Community Resources: None  Equipment currently used at home: none  Supplies currently used at home: None    Does the patient's insurance plan have a 3 day qualifying hospital stay waiver?  Yes     Which insurance plan 3 day waiver is available? Alternative insurance waiver    Will the waiver be used for post-acute placement? No    Lifestyle & Psychosocial Needs:  Social Drivers of Health     Food Insecurity: Low Risk  (11/2/2024)    Food Insecurity     Within the past 12 months, did you worry that your food would run out before you got money to buy more?: No     Within the past 12 months, did the food you bought just not last and you didn t have money to get more?: No   Depression: Not at risk (2/9/2023)    [facility]    PHQ-2     PHQ-2 Score: 2   Housing Stability: Low Risk  (11/2/2024)    Housing Stability     Do you have housing? : Yes     Are you worried about losing your housing?: No   Tobacco Use: High Risk (7/12/2024)    Received from [facility]    Patient History     Smoking  Tobacco Use: Some Days     Smokeless Tobacco Use: Never     Passive Exposure: Not on file   Financial Resource Strain: Low Risk  (11/2/2024)    Financial Resource Strain     Within the past 12 months, have you or your family members you live with been unable to get utilities (heat, electricity) when it was really needed?: No   Alcohol Use: Not on file   Transportation Needs: Low Risk  (11/2/2024)    Transportation Needs     Within the past 12 months, has lack of transportation kept you from medical appointments, getting your medicines, non-medical meetings or appointments, work, or from getting things that you need?: No   Physical Activity: Not on file   Interpersonal Safety: Low Risk  (11/2/2024)    Interpersonal Safety     Do you feel physically and emotionally safe where you currently live?: Yes     Within the past 12 months, have you been hit, slapped, kicked or otherwise physically hurt by someone?: No     Within the past 12 months, have you been humiliated or emotionally abused in other ways by your partner or ex-partner?: No   Stress: Not on file   Social Connections: Not on file   Health Literacy: Not on file       Functional Status:  Prior to admission patient needed assistance:   Dependent ADLs:: Independent  Dependent IADLs:: Independent       Mental Health Status:          Chemical Dependency Status:                Values/Beliefs:  Spiritual, Cultural Beliefs, [employer] Practices, Values that affect care:                 Discussed  Partnership in Safe Discharge Planning  document with patient/family: No    Care Management Discharge Note    Discharge Date: 11/04/2024     Discharge Disposition: Home    Discharge Services: None    Discharge DME: None    Discharge Transportation: family or friend will provide    Private pay costs discussed: Not applicable    Does the patient's insurance plan have a 3 day qualifying hospital stay waiver?  Yes     Which insurance plan 3 day waiver is available? Alternative  insurance waiver    Will the waiver be used for post-acute placement? No    Education Provided on the Discharge Plan: Yes    Patient/Family in Agreement with the Plan: yes    Handoff Referral Completed: No, handoff not indicated or clinically appropriate    Additional Information:  CM/SW consulted for discharge planning/elevated unplanned readmission risk score of 24%. Pt admitted with Human rhinovirus/enterovirus infection, Acute hypoxic respiratory failure.  Possible secondary bacterial pneumonia,  Acute COPD exacerbation, CHF exacerbation.     Met with pt at bedside. [facility] Pt states he is independent and receives no services from the facility.  Pt cares for spouse with dementia, spouse is receiving AL services.  Pt is independent with ADLs and IADLs. Pt continues to work outside the home 2 days/week.    Therapy is recommending Home PT. Pt plans on returning to work, therefore he is clearly not homebound. [facility] stated [facility], which he will plan on pursuing.  Pt denies having any discharge needs.  Please call if discharge needs arise.    Leti Cheema RN   Inpatient Care Coordination  [facility]   Phone: [phone]

## 2024-11-03 NOTE — PROGRESS NOTES
[facility]    Medicine Progress Note - Hospitalist Service    Date of Admission:  11/1/2024    Assessment & Plan     Crow Salinas is a 89 year old male w/PMH severe COPD, nicotine depencence, afib on DOAC, diastolic HF, HTN, DLD, CKD, pulmonary HTN, AAA, chronic back pain with narcotic dependence who presents from home with sob, cough and suspected to have CHF, CAP, COPD.     Human rhinovirus/enterovirus infection.  Acute hypoxic respiratory failure.  Possible secondary bacterial pneumonia.  Acute COPD exacerbation.  -Supplemental oxygen as needed.  -Continue prednisone 40 mg a day.  -Continue azithromycin for a 5-day course.  -Continue ceftriaxone 1 g IV every 24 hours.  -Continue scheduled DuoNebs.  -Additional albuterol if needed.     Acute exacerbation of chronic heart failure with preserved ejection fraction.  -Initially required diuresis with IV furosemide.   -Fluid status improved.  -Give furosemide 20 mg once today.  -Continue to weigh daily.    -Intake and output monitoring.     Tobacco use disorder.  -Nicotine gum if needed.     Atrial fibrillation.  Drug-induced coagulation defect.  Sinus pauses.  Hypertension.  -Continue decreased dose metoprolol 50 mg twice a day.  -Continue to monitor on telemetry.  -Continue apixaban 5 mg twice a day.     Chronic kidney disease stage IIIa.  -Creatinine at baseline.  -Avoid nephrotoxins as able.  -Recheck metabolic panel tomorrow.     Diarrhea.  -Suspect related to rhinovirus/enterovirus infection.  -Check C. difficile toxin.    Thrombocytopenia.  -Chronic.  -Platelets stable for the past year.  -Monitor CBC intermittently.          Diet: Combination Diet Regular Diet Adult    DVT Prophylaxis: DOAC  [place] Catheter: Not present  Lines: None     Cardiac Monitoring: ACTIVE order. Indication: Acute decompensated heart failure (48 hours)  Code Status: Full Code      Clinically Significant Risk Factors           # Hypocalcemia: Lowest Ca = 8.5 mg/dL  in last 2 days, will monitor and replace as appropriate       # Thrombocytopenia: Lowest platelets = 126 in last 2 days, will monitor for bleeding   # Hypertension: Noted on problem list                      Disposition Plan     Medically Ready for Discharge: Anticipated Tomorrow             Raul Pagan DO  Hospitalist Service  [facility]  Securely message with [employer] (more info)  Text page via [employer] Paging/Directory   ______________________________________________________________________    Interval History   Short of breath.  Coughing.  Denies chest pain, fevers, chills, nausea, vomiting.    Physical Exam   Vital Signs: Temp: 98.3  F (36.8  C) Temp src: Oral BP: 120/57 Pulse: 63   Resp: 16 SpO2: 96 % O2 Device: None (Room air)    Weight: 146 lbs 3.2 oz    Gen:  NAD, A&Ox3.  Eyes:  PERRL, sclera anicteric.  OP:  MMM, no lesions.  Neck:  Supple.  CV:  Irregular, no murmurs.  Lung:  Wheeze b/l, normal effort.  Ab:  +BS, soft.  Skin:  Warm, dry to touch.  No rash.  Ext:  Mild non pitting edema LE b/l.      Medical Decision Making       40 MINUTES SPENT BY ME on the date of service doing chart review, history, exam, documentation & further activities per the note.      Data

## 2024-11-03 NOTE — PLAN OF CARE
Goal Outcome Evaluation:      Plan of Care Reviewed With: patient    Overall Patient Progress: improvingOverall Patient Progress: improving    Outcome Evaluation: Anticipate discharge to home.

## 2024-11-03 NOTE — PROVIDER NOTIFICATION
MD paged: Pt reports some throat soreness from coughing and requesting PRN lozenges. Could you please place an order? Thanks.     Order placed.

## 2024-11-03 NOTE — PLAN OF CARE
"Shift: [id number]    A&Ox4. Denies pain. Ind in room. LS: dim. Tele: Afib CVR. Lasix 20 mg given once today. SW consulted. Continue POC.     Goal Outcome Evaluation:      Plan of Care Reviewed With: patient    Overall Patient Progress: improvingOverall Patient Progress: improving    Outcome Evaluation: Sating well on RA.            Problem: Adult Inpatient Plan of Care  Goal: Plan of Care Review  Description: The Plan of Care Review/Shift note should be completed every shift.  The Outcome Evaluation is a brief statement about your assessment that the patient is improving, declining, or no change.  This information will be displayed automatically on your shift  note.  11/3/2024 1320 by Shanice May, RN  Outcome: Progressing  Flowsheets (Taken 11/3/2024 1320)  Outcome Evaluation: Sating well on RA.  Plan of Care Reviewed With: patient  Overall Patient Progress: improving  11/3/2024 1320 by Shanice May, RN  Outcome: Progressing  Flowsheets (Taken 11/3/2024 1320)  Outcome Evaluation: Sating well on RA.  Plan of Care Reviewed With: patient  Overall Patient Progress: improving  Goal: Patient-Specific Goal (Individualized)  Description: You can add care plan individualizations to a care plan. Examples of Individualization might be:  \"Parent requests to be called daily at 9am for status\", \"I have a hard time hearing out of my right ear\", or \"Do not touch me to wake me up as it startles  me\".  11/3/2024 1320 by Shanice May RN  Outcome: Progressing  11/3/2024 1320 by Shanice May RN  Outcome: Progressing  Goal: Absence of Hospital-Acquired Illness or Injury  11/3/2024 1320 by Shanice May, RN  Outcome: Progressing  11/3/2024 1320 by Shanice May, RN  Outcome: Progressing  Intervention: Identify and Manage Fall Risk  Recent Flowsheet Documentation  Taken 11/3/2024 1247 by Shanice May, RN  Safety Promotion/Fall Prevention:   assistive device/personal items within reach   clutter free environment " maintained   increased rounding and observation   increase visualization of patient   lighting adjusted   nonskid shoes/slippers when out of bed   patient and family education   safety round/check completed  Intervention: Prevent Skin Injury  Recent Flowsheet Documentation  Taken 11/3/2024 1247 by Shanice May RN  Body Position: position changed independently  Goal: Optimal Comfort and Wellbeing  11/3/2024 1320 by Shanice May RN  Outcome: Progressing  11/3/2024 1320 by Shanice May RN  Outcome: Progressing  Goal: Readiness for Transition of Care  11/3/2024 1320 by Shanice May RN  Outcome: Progressing  11/3/2024 1320 by Shanice May RN  Outcome: Progressing     Problem: Comorbidity Management  Goal: Blood Pressure in Desired Range  11/3/2024 1320 by Shanice May RN  Outcome: Progressing  11/3/2024 1320 by Shanice May RN  Outcome: Progressing  Intervention: Maintain Blood Pressure Management  Recent Flowsheet Documentation  Taken 11/3/2024 1247 by Shanice May RN  Medication Review/Management: medications reviewed     Problem: Pneumonia  Goal: Fluid Balance  Outcome: Progressing  Goal: Resolution of Infection Signs and Symptoms  Outcome: Progressing  Intervention: Prevent Infection Progression  Recent Flowsheet Documentation  Taken 11/3/2024 1247 by Shanice May RN  Isolation Precautions: droplet precautions maintained  Goal: Effective Oxygenation and Ventilation  Outcome: Progressing  Intervention: Promote Airway Secretion Clearance  Recent Flowsheet Documentation  Taken 11/3/2024 1247 by Shanice May RN  Activity Management: up ad sisi

## 2024-11-03 NOTE — PROVIDER NOTIFICATION
MD paged: Pt reports taking 5 mg melatonin before bed. Can we please get an order? Thanks.     Order placed

## 2024-11-03 NOTE — PLAN OF CARE
"/55 (BP Location: Left arm, Patient Position: Sitting, Cuff Size: Adult Regular)   Pulse 85   Temp 97.9  F (36.6  C) (Oral)   Resp 16   Ht 1.803 m (5' 11\")   Wt 66.3 kg (146 lb 3.2 oz)   SpO2 94%   BMI 20.39 kg/m      VSS on RA. Denies pain. PIV SL. Aox4. [place]. LS: dim. CLAIRE reported, but improved per pt. Cough reported as well. Tele: A fib CVR. No edema noted. BM 11/2. Occasionally incontinent of bowel and bladder at times. See flowsheet for skin assessment. Regular diet. Independent. Plan: PO Zithromax. IV Rocephin. Strict I/O and daily weights. Prednisone. Possible discharge home today. Continue w/ POC.    Goal Outcome Evaluation:      Plan of Care Reviewed With: patient    Overall Patient Progress: improvingOverall Patient Progress: improving    Outcome Evaluation: Sating well on RA. No edema noted. BP WDL.      Problem: Adult Inpatient Plan of Care  Goal: Plan of Care Review  Description: The Plan of Care Review/Shift note should be completed every shift.  The Outcome Evaluation is a brief statement about your assessment that the patient is improving, declining, or no change.  This information will be displayed automatically on your shift  note.  11/3/2024 0109 by Sumaya Ramsey, RN  Outcome: Progressing  Flowsheets (Taken 11/3/2024 0109)  Outcome Evaluation: Sating well on RA. No edema noted. BP WDL.  Plan of Care Reviewed With: patient  Overall Patient Progress: improving  11/2/2024 2232 by Sumaya Ramsey, RN  Outcome: Progressing  Flowsheets (Taken 11/2/2024 2232)  Plan of Care Reviewed With: patient  Overall Patient Progress: improving  Goal: Patient-Specific Goal (Individualized)  Description: You can add care plan individualizations to a care plan. Examples of Individualization might be:  \"Parent requests to be called daily at 9am for status\", \"I have a hard time hearing out of my right ear\", or \"Do not touch me to wake me up as it startles  me\".  11/3/2024 0109 by Sumaya Ramsey, " RN  Outcome: Progressing  11/2/2024 2232 by Sumaya Ramsey RN  Outcome: Progressing  Goal: Absence of Hospital-Acquired Illness or Injury  11/3/2024 0109 by Sumaya Ramsey RN  Outcome: Progressing  11/2/2024 2232 by Sumaya Ramsey RN  Outcome: Progressing  Intervention: Identify and Manage Fall Risk  Recent Flowsheet Documentation  Taken 11/2/2024 2308 by Sumaya Ramsey RN  Safety Promotion/Fall Prevention: safety round/check completed  Taken 11/2/2024 2133 by Sumaya Ramsey RN  Safety Promotion/Fall Prevention: safety round/check completed  Intervention: Prevent Skin Injury  Recent Flowsheet Documentation  Taken 11/2/2024 2307 by Sumaya Ramsey RN  Body Position: position changed independently  Taken 11/2/2024 2132 by Sumaya Ramsey RN  Body Position: position changed independently  Intervention: Prevent and Manage VTE (Venous Thromboembolism) Risk  Recent Flowsheet Documentation  Taken 11/2/2024 2133 by Sumaya Ramsey RN  VTE Prevention/Management: SCDs off (sequential compression devices)  Intervention: Prevent Infection  Recent Flowsheet Documentation  Taken 11/2/2024 2308 by Sumaya Ramsey RN  Infection Prevention:   rest/sleep promoted   single patient room provided   personal protective equipment utilized   hand hygiene promoted   equipment surfaces disinfected   environmental surveillance performed   cohorting utilized  Taken 11/2/2024 2133 by Sumaya Ramsey RN  Infection Prevention:   rest/sleep promoted   single patient room provided   personal protective equipment utilized   hand hygiene promoted   equipment surfaces disinfected   environmental surveillance performed   cohorting utilized  Goal: Optimal Comfort and Wellbeing  11/3/2024 0109 by Sumaya Ramsey RN  Outcome: Progressing  11/2/2024 2232 by Sumaya Ramsey RN  Outcome: Progressing  Goal: Readiness for Transition of Care  11/3/2024 0109 by Sumaya Ramsey RN  Outcome: Progressing  11/2/2024 2232 by Sumaya Ramsey  RN  Outcome: Progressing     Problem: ARDS (Acute Respiratory Distress Syndrome)  Goal: Effective Oxygenation  11/3/2024 0109 by Sumaya Ramsey RN  Outcome: Adequate for Care Transition  11/2/2024 2232 by Sumaya Ramsey RN  Outcome: Progressing  Intervention: Optimize Oxygenation, Ventilation and Perfusion  Recent Flowsheet Documentation  Taken 11/2/2024 2307 by Sumaya Ramsey RN  Head of Bed (HOB) Positioning: HOB at 15 degrees  Taken 11/2/2024 2132 by Sumaya Ramsey RN  Head of Bed (HOB) Positioning: HOB at 15 degrees     Problem: Comorbidity Management  Goal: Maintenance of COPD Symptom Control  11/3/2024 0109 by Sumaya Ramsey, RN  Outcome: Adequate for Care Transition  11/2/2024 2232 by Sumaya Ramsey RN  Outcome: Progressing  Intervention: Maintain COPD Symptom Control  Recent Flowsheet Documentation  Taken 11/2/2024 2308 by Sumaya Ramsey RN  Medication Review/Management: medications reviewed  Taken 11/2/2024 2133 by Sumaya Ramsey RN  Medication Review/Management: medications reviewed  Goal: Maintenance of Heart Failure Symptom Control  11/3/2024 0109 by Sumaya Ramsey RN  Outcome: Adequate for Care Transition  11/2/2024 2232 by Sumaya Ramsey, RN  Outcome: Progressing  Intervention: Maintain Heart Failure Management  Recent Flowsheet Documentation  Taken 11/2/2024 2308 by Sumaya Ramsey RN  Medication Review/Management: medications reviewed  Taken 11/2/2024 2133 by Sumaya Ramsey RN  Medication Review/Management: medications reviewed  Goal: Blood Pressure in Desired Range  11/3/2024 0109 by Sumaya Ramsey, RN  Outcome: Progressing  11/2/2024 2232 by Sumaya Ramsey, RN  Outcome: Progressing  Intervention: Maintain Blood Pressure Management  Recent Flowsheet Documentation  Taken 11/2/2024 2308 by Sumaya Ramsey, RN  Medication Review/Management: medications reviewed  Taken 11/2/2024 2133 by Sumaya Ramsey RN  Medication Review/Management: medications reviewed     Problem: Skin  Injury Risk Increased  Goal: Skin Health and Integrity  11/3/2024 0109 by Sumaya Ramsey, RN  Outcome: Adequate for Care Transition  11/2/2024 2232 by Sumaya Ramsey, RN  Outcome: Progressing  Intervention: Optimize Skin Protection  Recent Flowsheet Documentation  Taken 11/2/2024 2307 by Sumaya Ramsey, RN  Activity Management: up ad sisi  Head of Bed (HOB) Positioning: HOB at 15 degrees  Taken 11/2/2024 2132 by Sumaya Ramsey, RN  Activity Management: activity adjusted per tolerance  Head of Bed (HOB) Positioning: HOB at 15 degrees

## 2024-11-03 NOTE — PROVIDER NOTIFICATION
MD paged: Notified by telemetry. Pt had 2.3 second pause. Asymptomatic. Will continue to monitor.

## 2024-11-03 NOTE — PHARMACY-ADMISSION MEDICATION HISTORY
Pharmacist Admission Medication History    Admission medication history is complete. The information provided in this note is only as accurate as the sources available at the time of the update.    Information Source(s): Patient, Family member, Patient's pharmacy, and CareEverywhere/SureScripts via in-person and phone    Pertinent Information: Pt not very familiar with medications. [facility] records indicate [facility] medications below. Confirmed Rx meds with pt home med list with pt daughter over phone.    Changes made to PTA medication list:  Added: ALL  Deleted: None  Changed: None    Allergies reviewed with patient and updates made in EHR: no    Medication History Completed By: Norma Mcneil LUANA 11/3/2024 4:14 PM    PTA Med List   Medication Sig Note Last Dose/Taking    acetaminophen (TYLENOL) 500 MG tablet Take 500 mg by mouth every 4 hours as needed for mild pain. 11/3/2024: [facility] records Taking As Needed    albuterol (PROVENTIL) (2.5 MG/3ML) 0.083% neb solution Take 2.5 mg by nebulization every 6 hours as needed for shortness of breath, wheezing or cough. 11/3/2024: [facility] records Taking As Needed    amLODIPine (NORVASC) 10 MG tablet Take 10 mg by mouth daily. 11/3/2024: Last filled 10/7/24 #90ds #90 Past Week    apixaban ANTICOAGULANT (ELIQUIS) 5 MG tablet Take 5 mg by mouth 2 times daily. 11/3/2024: Per [facility] records Past Week    Apoaequorin (PREVAGEN PO) Take 1 capsule by mouth daily. 11/3/2024: Per [facility] records Taking    atorvastatin (LIPITOR) 10 MG tablet Take 10 mg by mouth daily. 11/3/2024: [facility] records, unclear if taking atorvastatin or simvastatin (patient thinks taking atorvastatin) Past Week    cyanocobalamin (VITAMIN B-12) 1000 MCG tablet Take 1,000 mcg by mouth daily. 11/3/2024: Per [facility] records Taking    cyclobenzaprine (FLEXERIL) 10 MG tablet Take 10 mg by mouth 3 times daily as needed for muscle spasms. 11/3/2024: [facility] records Taking As Needed    docusate sodium (COLACE) 100 MG capsule Take 100 mg by  mouth daily. 11/3/2024: Per [facility] records Past Week    losartan (COZAAR) 100 MG tablet Take 100 mg by mouth daily. 11/3/2024: Last filled 9/19/24 #90ds #90 Past Week    MELATONIN PO Take 1 tablet by mouth nightly as needed (sleep). 11/3/2024: Per [facility] records, dose not specified Taking As Needed    metoprolol tartrate (LOPRESSOR) 100 MG tablet Take 100 mg by mouth 2 times daily. 11/3/2024: Per [facility] records Past Week    multivitamin w/minerals (MULTI-VITAMIN) tablet Take 1 tablet by mouth daily. 11/3/2024: Per [facility] records Taking    omeprazole (PRILOSEC) 20 MG DR capsule Take 20 mg by mouth 2 times daily. 11/3/2024: Per [facility] records Past Week    oxyCODONE-acetaminophen (PERCOCET) 5-325 MG tablet Take 0.25-0.5 tablets by mouth 2 times daily as needed for severe pain. 1/2 tablet in morning as needed  1/4-1/2 tablet in evening as needed 11/3/2024: Per [facility] records Past Month    sildenafil (VIAGRA) 100 MG tablet Take 100 mg by mouth daily as needed. 11/3/2024: Per [facility] records Taking As Needed

## 2024-11-03 NOTE — PROGRESS NOTES
11/03/24 1029   Appointment Info   Signing Clinician's Name / Credentials (PT) Crow Hodges DPT   Living Environment   Living Environment Comments Pt reports just moving into ILF with spouse. Ind with mobility at baseline. Reports spouse has dementia and spouse provides lots of cues, safety for patient.   Self-Care   Usual Activity Tolerance good   Current Activity Tolerance good   Equipment Currently Used at Home none   Fall history within last six months no   General Information   Onset of Illness/Injury or Date of Surgery 11/01/24   Referring Physician Raul Pagan D, DO   Patient/Family Therapy Goals Statement (PT) To feel better, return home   Pertinent History of Current Problem (include personal factors and/or comorbidities that impact the POC) Per H&P, pt is an 89 year old male  w/PMH severe COPD, nicotine depencence, afib on DOAC, diastolic HF, HTN, DLD, CKD, pulmonary HTN, AAA, chronic back pain with narcotic dependence who presents from home with sob, cough and suspected to have CHF, CAP, COPD.   Cognition   Affect/Mental Status (Cognition) WFL   Orientation Status (Cognition) oriented x 4   Follows Commands (Cognition) WFL   Pain Assessment   Patient Currently in Pain No   Range of Motion (ROM)   ROM Comment reports intermittent difficulty with R shoulder ROM   Strength (Manual Muscle Testing)   Strength Comments decreased R shoulder strength; B LE WFL   Bed Mobility   Comment, (Bed Mobility) ind supine <> sit   Transfers   Comment, (Transfers) ind sit <> stand   Gait/Stairs (Locomotion)   Distance in Feet (Gait) 180   Pattern (Gait) step-through   Comment, (Gait/Stairs) ind w/o AD   Balance   Balance Comments good sitting; good standing w/o AD   Clinical Impression   Criteria for Skilled Therapeutic Intervention Evaluation only   Clinical Presentation (PT Evaluation Complexity) stable   Clinical Presentation Rationale Functional status   Clinical Decision Making (Complexity) low  complexity   Risk & Benefits of therapy have been explained evaluation/treatment results reviewed;participants voiced agreement with care plan;participants included;patient   PT Total Evaluation Time   PT Eval, Low Complexity Minutes (76000) 12   PT Discharge Planning   PT Discharge Recommendation (DC Rec) home with home care physical therapy   PT Rationale for DC Rec Pt is at baseline mobility, able to perform all activity ind'ly. Pt with no concerns with mobility at home. 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   PT Brief overview of current status ind w/o AD   Physical Therapy Time and Intention   Total Session Time (sum of timed and untimed services) 12

## 2024-11-04 VITALS
DIASTOLIC BLOOD PRESSURE: 70 MMHG | SYSTOLIC BLOOD PRESSURE: 121 MMHG | TEMPERATURE: 97.6 F | HEIGHT: 71 IN | OXYGEN SATURATION: 94 % | RESPIRATION RATE: 16 BRPM | WEIGHT: 147.5 LBS | HEART RATE: 85 BPM | BODY MASS INDEX: 20.65 KG/M2

## 2024-11-04 LAB
ANION GAP SERPL CALCULATED.3IONS-SCNC: 12 MMOL/L (ref 7–15)
ATRIAL RATE - MUSE: 122 BPM
BUN SERPL-MCNC: 29.3 MG/DL (ref 8–23)
CALCIUM SERPL-MCNC: 8.2 MG/DL (ref 8.8–10.4)
CHLORIDE SERPL-SCNC: 101 MMOL/L (ref 98–107)
CREAT SERPL-MCNC: 1.17 MG/DL (ref 0.67–1.17)
DIASTOLIC BLOOD PRESSURE - MUSE: NORMAL MMHG
EGFRCR SERPLBLD CKD-EPI 2021: 60 ML/MIN/1.73M2
GLUCOSE SERPL-MCNC: 112 MG/DL (ref 70–99)
HCO3 SERPL-SCNC: 24 MMOL/L (ref 22–29)
HOLD SPECIMEN: NORMAL
INTERPRETATION ECG - MUSE: NORMAL
P AXIS - MUSE: NORMAL DEGREES
POTASSIUM SERPL-SCNC: 4 MMOL/L (ref 3.4–5.3)
PR INTERVAL - MUSE: NORMAL MS
QRS DURATION - MUSE: 86 MS
QT - MUSE: 420 MS
QTC - MUSE: 436 MS
R AXIS - MUSE: 66 DEGREES
SODIUM SERPL-SCNC: 137 MMOL/L (ref 135–145)
SYSTOLIC BLOOD PRESSURE - MUSE: NORMAL MMHG
T AXIS - MUSE: 72 DEGREES
VENTRICULAR RATE- MUSE: 65 BPM

## 2024-11-04 PROCEDURE — 94640 AIRWAY INHALATION TREATMENT: CPT

## 2024-11-04 PROCEDURE — 250N000009 HC RX 250: Performed by: INTERNAL MEDICINE

## 2024-11-04 PROCEDURE — 250N000013 HC RX MED GY IP 250 OP 250 PS 637: Performed by: INTERNAL MEDICINE

## 2024-11-04 PROCEDURE — 94640 AIRWAY INHALATION TREATMENT: CPT | Mod: 76

## 2024-11-04 PROCEDURE — 999N000157 HC STATISTIC RCP TIME EA 10 MIN

## 2024-11-04 PROCEDURE — 99239 HOSP IP/OBS DSCHRG MGMT >30: CPT | Performed by: INTERNAL MEDICINE

## 2024-11-04 PROCEDURE — 250N000013 HC RX MED GY IP 250 OP 250 PS 637: Performed by: HOSPITALIST

## 2024-11-04 PROCEDURE — 36415 COLL VENOUS BLD VENIPUNCTURE: CPT | Performed by: INTERNAL MEDICINE

## 2024-11-04 PROCEDURE — 82565 ASSAY OF CREATININE: CPT | Performed by: INTERNAL MEDICINE

## 2024-11-04 PROCEDURE — 250N000012 HC RX MED GY IP 250 OP 636 PS 637: Performed by: HOSPITALIST

## 2024-11-04 PROCEDURE — 80048 BASIC METABOLIC PNL TOTAL CA: CPT | Performed by: INTERNAL MEDICINE

## 2024-11-04 RX ORDER — AZITHROMYCIN 250 MG/1
250 TABLET, FILM COATED ORAL DAILY
Qty: 2 TABLET | Refills: 0 | Status: SHIPPED | OUTPATIENT
Start: 2024-11-04 | End: 2024-11-06

## 2024-11-04 RX ORDER — METOPROLOL TARTRATE 100 MG/1
50 TABLET ORAL 2 TIMES DAILY
Status: SHIPPED
Start: 2024-11-04

## 2024-11-04 RX ORDER — SILDENAFIL 25 MG/1
100 TABLET, FILM COATED ORAL DAILY PRN
Status: DISCONTINUED | OUTPATIENT
Start: 2024-11-04 | End: 2024-11-04 | Stop reason: HOSPADM

## 2024-11-04 RX ORDER — ALBUTEROL SULFATE 0.83 MG/ML
2.5 SOLUTION RESPIRATORY (INHALATION) EVERY 6 HOURS PRN
Status: DISCONTINUED | OUTPATIENT
Start: 2024-11-04 | End: 2024-11-04 | Stop reason: HOSPADM

## 2024-11-04 RX ORDER — MULTIPLE VITAMINS W/ MINERALS TAB 9MG-400MCG
1 TAB ORAL DAILY
Status: DISCONTINUED | OUTPATIENT
Start: 2024-11-04 | End: 2024-11-04 | Stop reason: HOSPADM

## 2024-11-04 RX ORDER — PREDNISONE 20 MG/1
40 TABLET ORAL DAILY
Qty: 4 TABLET | Refills: 0 | Status: SHIPPED | OUTPATIENT
Start: 2024-11-05 | End: 2024-11-07

## 2024-11-04 RX ORDER — DOCUSATE SODIUM 100 MG/1
100 CAPSULE, LIQUID FILLED ORAL DAILY
Status: DISCONTINUED | OUTPATIENT
Start: 2024-11-04 | End: 2024-11-04 | Stop reason: HOSPADM

## 2024-11-04 RX ORDER — AMLODIPINE BESYLATE 10 MG/1
10 TABLET ORAL DAILY
Status: DISCONTINUED | OUTPATIENT
Start: 2024-11-04 | End: 2024-11-04 | Stop reason: HOSPADM

## 2024-11-04 RX ORDER — ACETAMINOPHEN 500 MG
500 TABLET ORAL EVERY 4 HOURS PRN
Status: DISCONTINUED | OUTPATIENT
Start: 2024-11-04 | End: 2024-11-04 | Stop reason: HOSPADM

## 2024-11-04 RX ORDER — CYCLOBENZAPRINE HCL 10 MG
10 TABLET ORAL 3 TIMES DAILY PRN
Status: DISCONTINUED | OUTPATIENT
Start: 2024-11-04 | End: 2024-11-04 | Stop reason: HOSPADM

## 2024-11-04 RX ORDER — GUAIFENESIN 200 MG/10ML
200 LIQUID ORAL EVERY 4 HOURS PRN
Qty: 118 ML | Refills: 0 | Status: SHIPPED | OUTPATIENT
Start: 2024-11-04 | End: 2024-12-04

## 2024-11-04 RX ORDER — PANTOPRAZOLE SODIUM 40 MG/1
40 TABLET, DELAYED RELEASE ORAL 2 TIMES DAILY
Status: DISCONTINUED | OUTPATIENT
Start: 2024-11-04 | End: 2024-11-04 | Stop reason: HOSPADM

## 2024-11-04 RX ORDER — OXYCODONE AND ACETAMINOPHEN 5; 325 MG/1; MG/1
1 TABLET ORAL 2 TIMES DAILY PRN
Status: SHIPPED
Start: 2024-11-04

## 2024-11-04 RX ORDER — OXYCODONE AND ACETAMINOPHEN 5; 325 MG/1; MG/1
1 TABLET ORAL 2 TIMES DAILY PRN
Status: DISCONTINUED | OUTPATIENT
Start: 2024-11-04 | End: 2024-11-04 | Stop reason: HOSPADM

## 2024-11-04 RX ORDER — ATORVASTATIN CALCIUM 10 MG/1
10 TABLET, FILM COATED ORAL DAILY
Status: DISCONTINUED | OUTPATIENT
Start: 2024-11-04 | End: 2024-11-04 | Stop reason: HOSPADM

## 2024-11-04 RX ORDER — UREA 10 %
1000 LOTION (ML) TOPICAL DAILY
Status: DISCONTINUED | OUTPATIENT
Start: 2024-11-04 | End: 2024-11-04 | Stop reason: HOSPADM

## 2024-11-04 RX ORDER — SILDENAFIL 100 MG/1
100 TABLET, FILM COATED ORAL DAILY PRN
Status: SHIPPED
Start: 2024-11-04

## 2024-11-04 RX ORDER — METOPROLOL TARTRATE 50 MG
100 TABLET ORAL 2 TIMES DAILY
Status: DISCONTINUED | OUTPATIENT
Start: 2024-11-04 | End: 2024-11-04 | Stop reason: HOSPADM

## 2024-11-04 RX ORDER — LOSARTAN POTASSIUM 100 MG/1
100 TABLET ORAL DAILY
Status: DISCONTINUED | OUTPATIENT
Start: 2024-11-04 | End: 2024-11-04 | Stop reason: HOSPADM

## 2024-11-04 RX ADMIN — LOSARTAN POTASSIUM 100 MG: 100 TABLET, FILM COATED ORAL at 11:47

## 2024-11-04 RX ADMIN — IPRATROPIUM BROMIDE AND ALBUTEROL SULFATE 3 ML: .5; 3 SOLUTION RESPIRATORY (INHALATION) at 08:10

## 2024-11-04 RX ADMIN — APIXABAN 5 MG: 5 TABLET, FILM COATED ORAL at 09:40

## 2024-11-04 RX ADMIN — ATORVASTATIN CALCIUM 10 MG: 10 TABLET, FILM COATED ORAL at 11:47

## 2024-11-04 RX ADMIN — Medication 5 MG: at 01:59

## 2024-11-04 RX ADMIN — IPRATROPIUM BROMIDE AND ALBUTEROL SULFATE 3 ML: .5; 3 SOLUTION RESPIRATORY (INHALATION) at 11:17

## 2024-11-04 RX ADMIN — PREDNISONE 40 MG: 20 TABLET ORAL at 09:39

## 2024-11-04 RX ADMIN — METOPROLOL TARTRATE 50 MG: 50 TABLET, FILM COATED ORAL at 09:39

## 2024-11-04 RX ADMIN — Medication 1 TABLET: at 11:47

## 2024-11-04 RX ADMIN — CYANOCOBALAMIN TAB 500 MCG 1000 MCG: 500 TAB at 11:46

## 2024-11-04 ASSESSMENT — ACTIVITIES OF DAILY LIVING (ADL)
ADLS_ACUITY_SCORE: 0

## 2024-11-04 NOTE — DISCHARGE SUMMARY
[facility]  Discharge Summary  Hospitalist      Date of Admission:  11/1/2024  Date of Discharge:  11/4/2024  Provider:  Fani Hernandez MD  Date of Service (when I last saw the patient): 11/04/24      Primary Provider: Richard Zambrano          Discharge Diagnosis:     Discharge Diagnoses   Human rhinovirus/enterovirus infection.  Acute hypoxic respiratory failure.  Possible secondary bacterial pneumonia.  Acute COPD exacerbation  Acute exacerbation of chronic heart failure with preserved ejection fraction.   Chronic kidney disease stage IIIa.     Other medical issues:  Past Medical History:   Diagnosis Date    A-fib (H)           History of Present Illness   Crow Salinas is an 89 year old male who presented with shortness of breath and cough.  Please see the admission history and physical for full details.    Hospital Course     Crow Salinas was admitted on 11/1/2024. is a 89 year old male w/PMH severe COPD, nicotine depencence, afib on DOAC, diastolic HF, HTN, DLD, CKD, pulmonary HTN, AAA, chronic back pain with narcotic dependence who presents from home with sob, cough and suspected to have CHF, CAP, COPD.  Virology showed positive rhinovirus likely causing acute COPD exacerbation.   Treated with nebs steroids and also received antibiotics for possible community-acquired pneumonia.  Clinically has improved.  Is not requiring supplemental O2.  Breathing has improved has some lingering cough but denies any fever or chills.  Feels he is at his baseline.  Vitals are stable.  Maintaining oxygenation on room air.  Is being discharged home with plans to follow with primary care provider.  Patient is in agreement with plan.    The following problems were addressed during his hospitalization:    Human rhinovirus/enterovirus infection.  Acute hypoxic respiratory failure.  Possible secondary bacterial pneumonia.  Acute COPD exacerbation.  -Not requiring supplemental O2 on discharge  -Continue  prednisone 40 mg a day for 2 more days on discharge  -Continue azithromycin for a 5-day course. (2 days on discharge)     Acute exacerbation of chronic heart failure with preserved ejection fraction.  -Initially required diuresis with IV furosemide.   -Fluid status improved.  -Weight is stable.  No evidence of acute exacerbation of heart failure discharge.  Is being discharged with plans to follow with primary care provider     Tobacco use disorder.  -Nicotine gum if needed.     Atrial fibrillation.  Drug-induced coagulation defect.  Sinus pauses.  Hypertension.  -Continue decreased dose metoprolol 50 mg twice a day.  -Continue apixaban 5 mg twice a day.  -Resume losartan on discharge patient to follow with primary care provider with record of blood pressure     Chronic kidney disease stage IIIa.  -Creatinine at baseline and stable  -Avoid nephrotoxins as able.  -Recheck in 1 week as outpatient primary care provider     Diarrhea.  -Suspect related to rhinovirus/enterovirus infection.  -C. difficile toxin negative    Thrombocytopenia.  -Chronic.  -Platelets stable for the past year.  -Monitor CBC intermittently as outpatient      Significant Results and Procedures   As noted above    Pending Results   Unresulted Labs Ordered in the Past 30 Days of this Admission       Date and Time Order Name Status Description    11/1/2024  8:50 PM Blood Culture Peripheral Blood Preliminary             Code Status   Full Code       Primary Care Physician   Son Peyman Zambrano    Physical Exam   Temp: 97.6  F (36.4  C) Temp src: Oral BP: 111/52 Pulse: 71   Resp: 16 SpO2: 96 % O2 Device: None (Room air)    Vitals:    11/02/24 0148 11/03/24 0502 11/04/24 0611   Weight: 69.3 kg (152 lb 12.8 oz) 66.3 kg (146 lb 3.2 oz) 66.9 kg (147 lb 8 oz)     Vital Signs with Ranges  Temp:  [97.6  F (36.4  C)-98.2  F (36.8  C)] 97.6  F (36.4  C)  Pulse:  [71-89] 71  Resp:  [16-20] 16  BP: (111-135)/(52-72) 111/52  SpO2:  [92 %-97 %] 96 %  I/O last 3  completed shifts:  In: 656 [P.O.:650; I.V.:6]  Out: -     Gen:  NAD, A&Ox3.  Eyes:  PERRL, sclera anicteric.  OP:  MMM, no lesions.  Neck:  Supple.  CV:  Irregular, no murmurs.  Lung: No wheezing.  Bilateral air entry  Ab:  +BS, soft.  Skin:  Warm, dry to touch.  No rash.  Ext:  Mild non pitting edema LE b/l.         Discharge Disposition   Discharged to home    Consultations This Hospital Stay   PHYSICAL THERAPY ADULT IP CONSULT  CARE MANAGEMENT / SOCIAL WORK IP CONSULT    Time Spent on this Encounter   I, Fani Hernandez MD, personally saw the patient today and spent greater than 30 minutes discharging this patient.    Discharge Orders      Reason for your hospital stay    Please refer to discharge summary.  Briefly admitted for exacerbation of COPD secondary to rhinovirus infection.  Also treated for possible community-acquired pneumonia.     Follow-up and recommended labs and tests     Follow up with primary care provider, Richard Zambrano, within 7 days for hospital follow- up.  Follow-up basic metabolic panel.  Patient had some renal insufficiency which had improved prior to discharge.  Losartan was resumed.  Please recheck basic metabolic panel.  Also monitor blood pressure.  Beta-blocker was reduced to 50 mg twice daily if necessary would need to increase it as outpatient.  Patient to keep record of blood pressure and follow-up with primary care provider.  Patient to call or come if there are any new or worsening symptoms.     Activity    Your activity upon discharge: activity as tolerated     Monitor and record    Blood pressure: daily.     Diet    Follow this diet upon discharge: Current Diet:Orders Placed This Encounter      Combination Diet Regular Diet Adult     Discharge Medications   Current Discharge Medication List        START taking these medications    Details   azithromycin (ZITHROMAX) 250 MG tablet Take 1 tablet (250 mg) by mouth daily for 2 days.  Qty: 2 tablet, Refills: 0    Associated  Diagnoses: COPD exacerbation (H)      benzocaine-menthol (CHLORASEPTIC) 6-10 MG lozenge Place 1 lozenge inside cheek every 3 hours as needed for sore throat.  Qty: 30 lozenge, Refills: 0    Associated Diagnoses: COPD exacerbation (H)      guaiFENesin (ROBITUSSIN) 20 mg/mL liquid Take 10 mLs (200 mg) by mouth every 4 hours as needed for other (secretion expectorant).  Qty: 118 mL, Refills: 0    Associated Diagnoses: COPD exacerbation (H)      predniSONE (DELTASONE) 20 MG tablet Take 2 tablets (40 mg) by mouth daily for 2 days.  Qty: 4 tablet, Refills: 0    Associated Diagnoses: COPD exacerbation (H)      sodium chloride (OCEAN) 0.65 % nasal spray Spray 1 spray into both nostrils every hour as needed for congestion.  Qty: 30 mL, Refills: 0    Associated Diagnoses: COPD exacerbation (H)           CONTINUE these medications which have CHANGED    Details   metoprolol tartrate (LOPRESSOR) 100 MG tablet Take 0.5 tablets (50 mg) by mouth 2 times daily.    Comments: DEFER TO PRIMARY PRESCRIBER TO REASSES  Associated Diagnoses: Essential hypertension      oxyCODONE-acetaminophen (PERCOCET) 5-325 MG tablet Take 1 tablet by mouth 2 times daily as needed for severe pain. 1/2 tablet in morning as needed  1/4-1/2 tablet in evening as needed    Comments: DEFER TO PRIMARY PRESCRIBER  Associated Diagnoses: Status post total replacement of right hip      sildenafil (VIAGRA) 100 MG tablet Take 1 tablet (100 mg) by mouth daily as needed.    Comments: DEFER TO PRIMARY PRESCRIBER  Associated Diagnoses: Other male erectile dysfunction           CONTINUE these medications which have NOT CHANGED    Details   acetaminophen (TYLENOL) 500 MG tablet Take 500 mg by mouth every 4 hours as needed for mild pain.      albuterol (PROVENTIL) (2.5 MG/3ML) 0.083% neb solution Take 2.5 mg by nebulization every 6 hours as needed for shortness of breath, wheezing or cough.      amLODIPine (NORVASC) 10 MG tablet Take 10 mg by mouth daily.      apixaban  ANTICOAGULANT (ELIQUIS) 5 MG tablet Take 5 mg by mouth 2 times daily.      Apoaequorin (PREVAGEN PO) Take 1 capsule by mouth daily.      atorvastatin (LIPITOR) 10 MG tablet Take 10 mg by mouth daily.      cyanocobalamin (VITAMIN B-12) 1000 MCG tablet Take 1,000 mcg by mouth daily.      cyclobenzaprine (FLEXERIL) 10 MG tablet Take 10 mg by mouth 3 times daily as needed for muscle spasms.      docusate sodium (COLACE) 100 MG capsule Take 100 mg by mouth daily.      losartan (COZAAR) 100 MG tablet Take 100 mg by mouth daily.      MELATONIN PO Take 1 tablet by mouth nightly as needed (sleep).      multivitamin w/minerals (MULTI-VITAMIN) tablet Take 1 tablet by mouth daily.      omeprazole (PRILOSEC) 20 MG DR capsule Take 20 mg by mouth 2 times daily.           Allergies   Allergies   Allergen Reactions    Aspirin Other (See Comments)     Other reaction(s): FEELING OF WARMTH  Feeling of warmth      Etodolac Other (See Comments)     Data   Most Recent 3 CBC's:  Recent Labs   Lab Test 11/03/24  0622 11/02/24  0644 11/01/24 2046   WBC 7.4 6.6 9.4   HGB 12.1* 13.9 13.7   MCV 95 96 96   * 126* 135*      Most Recent 3 BMP's:  Recent Labs   Lab Test 11/04/24  0609 11/03/24  0623 11/02/24 0644    139 140   POTASSIUM 4.0 4.2 4.0   CHLORIDE 101 102 101   CO2 24 24 24   BUN 29.3* 28.9* 17.8   CR 1.17 1.35* 1.26*   ANIONGAP 12 13 15   MIRNA 8.2* 8.5* 8.5*   * 198* 125*     Most Recent 2 LFT's:  Recent Labs   Lab Test 11/01/24 2046   AST 27   ALT 17   ALKPHOS 106   BILITOTAL 0.9     Most Recent INR's and Anticoagulation Dosing History:  Anticoagulation Dose History           No data to display              Most Recent 3 Troponin's:  Recent Labs   Lab Test 07/12/17  1420   TROPI <0.015  The 99th percentile for upper reference range is 0.045 ug/L.  Troponin values in   the range of 0.045 - 0.120 ug/L may be associated with risks of adverse   clinical events.       Most Recent Cholesterol Panel:No lab results  found.  Most Recent 6 Bacteria Isolates From Any Culture (See EPIC Reports for Culture Details):No lab results found.  Most Recent TSH, T4 and A1c Labs:No lab results found.  Results for orders placed or performed during the hospital encounter of 24   Chest XR,  PA & LAT    Narrative    EXAM: XR CHEST 2 VIEWS  LOCATION: [facility]  DATE: 2024    INDICATION: fever, RLL rhonchi  COMPARISON: 2023      Impression    IMPRESSION: Since 2023 there has been a slight increase in interstitial lung markings most marked in the lung bases greatest on the right worrisome for underlying findings of an interstitial infiltrate. Otherwise the chest is stable with again seen   borderline hyperinflation of both lungs. Slightly tortuous and partially calcified thoracic aorta. Moderate degenerative changes most marked in the shoulders. Mild thoracolumbar spinal curvature.   Echocardiogram Complete     Value    LVEF  65%    Narrative    [id number]  [id number]  [id number]  [id number]^FRANK^GIBRAN^FREDY     [facility]  Echocardiography Laboratory  [address] [place] [address]     Name: CELINE PRICE  MRN: [id number]  : 1935  Study Date: 2024 10:11 AM  Age: 89 yrs  Gender: Male  Patient [facility]: [facility]  Reason For Study: CHF  Ordering Physician: GIBRAN MARIE  Performed By: JUDY Polo     BSA: 1.9 m2  Height: 71 in  Weight: 152 lb  HR: 80  BP: 130/66 mmHg  ______________________________________________________________________________  Procedure  Complete Portable Echo Adult.  ______________________________________________________________________________  Interpretation Summary     1. The left ventricle is normal in structure, function and size. The visual  ejection fraction is estimated at 65%.  2. The right ventricle is normal in structure, function and size.  3. No valve disease.     No previous echo for  comparison.  ______________________________________________________________________________  Left Ventricle  The left ventricle is normal in structure, function and size. There is normal  left ventricular wall thickness. The visual ejection fraction is estimated at  65%. Diastolic function not assessed due to atrial fibrillation. Normal left  ventricular wall motion.     Right Ventricle  The right ventricle is normal in structure, function and size.     Atria  There is moderate biatrial enlargement. There is no atrial shunt seen.     Mitral Valve  There is trace to mild mitral regurgitation.     Tricuspid Valve  There is mild (1+) tricuspid regurgitation.     Aortic Valve  The aortic valve is normal in structure and function.     Pulmonic Valve  The pulmonic valve is normal in structure and function.     Vessels  Normal ascending, transverse (arch), and descending aorta. The inferior vena  cava was normal in size with preserved respiratory variability.     Pericardium  There is no pericardial effusion.     Rhythm  The rhythm was atrial fibrillation.  ______________________________________________________________________________  MMode/2D Measurements & Calculations     IVSd: 0.73 cm  LVIDd: 4.1 cm  LVIDs: 3.2 cm  LVPWd: 0.72 cm  FS: 23.2 %  LV mass(C)d: 87.2 grams  LV mass(C)dI: 46.4 grams/m2  Ao root diam: 3.0 cm  LVOT diam: 2.0 cm  LVOT area: 3.2 cm2  Ao root diam index Ht(cm/m): 1.7  Ao root diam index BSA (cm/m2): 1.6  LA Volume (BP): 94.4 ml  LA Volume Index (BP): 50.2 ml/m2     RV Base: 3.8 cm  RWT: 0.35  TAPSE: 1.8 cm     Doppler Measurements & Calculations  MV max PG: 3.0 mmHg  MV mean P.1 mmHg  MV V2 VTI: 20.7 cm  PA V2 max: 79.7 cm/sec  PA max P.6 mmHg  PA acc time: 0.09 sec  TR max seven: 249.5 cm/sec  TR max P.9 mmHg  RV S Seven: 13.5 cm/sec     ______________________________________________________________________________  Report approved by: Claude Nicole 2024 11:42 AM                  Disclaimer: This note consists of symbols derived from keyboarding, dictation and/or voice recognition software. As a result, there may be errors in the script that have gone undetected. Please consider this when interpreting information found in this chart.

## 2024-11-04 NOTE — PLAN OF CARE
"/68 (BP Location: Left arm, Patient Position: Fowlers, Cuff Size: Adult Regular)   Pulse 89   Temp 98.2  F (36.8  C) (Oral)   Resp 16   Ht 1.803 m (5' 11\")   Wt 66.9 kg (147 lb 8 oz)   SpO2 94%   BMI 20.57 kg/m      VS: VSS on RA.  Pain: Denies pain.  Lines: PIV SL.  Cognitive: Aox4. [place].  Respiratory: LS: crackles. No SOB or CLAIRE reported.  Cardiac: Denies CP. Tele: A fib CVR.  Peripheral neurovascular: No edema. Pt reports baseline numbness and tingling. Pulses 2+.  GI: Last BM 11/3. Denies N/V.  : Voiding spontaneously.   Skin: See flowsheet for assessment.   Diet: Regular.  Activity: Independent.   Plan: Zithromax, IV Rocephin, prednisone. PT recommending HHPT at discharge. Likely discharge home today. Continue w/ POC.    Goal Outcome Evaluation:      Plan of Care Reviewed With: patient    Overall Patient Progress: improvingOverall Patient Progress: improving    Outcome Evaluation: BP WDL. Crackles noted in lungs, but no edema noted. Afebrile. Sating well on RA.      Problem: Adult Inpatient Plan of Care  Goal: Plan of Care Review  Description: The Plan of Care Review/Shift note should be completed every shift.  The Outcome Evaluation is a brief statement about your assessment that the patient is improving, declining, or no change.  This information will be displayed automatically on your shift  note.  Outcome: Progressing  Flowsheets (Taken 11/3/2024 2037)  Outcome Evaluation: BP WDL. Crackles noted in lungs, but no edema noted. Afebrile. Sating well on RA.  Plan of Care Reviewed With: patient  Overall Patient Progress: improving  Goal: Patient-Specific Goal (Individualized)  Description: You can add care plan individualizations to a care plan. Examples of Individualization might be:  \"Parent requests to be called daily at 9am for status\", \"I have a hard time hearing out of my right ear\", or \"Do not touch me to wake me up as it startles  me\".  Outcome: Progressing  Goal: Absence of " Hospital-Acquired Illness or Injury  Outcome: Progressing  Intervention: Identify and Manage Fall Risk  Recent Flowsheet Documentation  Taken 11/3/2024 1940 by Sumaya Ramsey RN  Safety Promotion/Fall Prevention: safety round/check completed  Intervention: Prevent Skin Injury  Recent Flowsheet Documentation  Taken 11/3/2024 1938 by Sumaya Ramsey RN  Body Position: position changed independently  Intervention: Prevent and Manage VTE (Venous Thromboembolism) Risk  Recent Flowsheet Documentation  Taken 11/3/2024 1940 by Sumaya Ramsey RN  VTE Prevention/Management:   SCDs off (sequential compression devices)   patient refused intervention  Intervention: Prevent Infection  Recent Flowsheet Documentation  Taken 11/3/2024 1940 by Sumaya Ramsey RN  Infection Prevention:   single patient room provided   rest/sleep promoted   personal protective equipment utilized   hand hygiene promoted   equipment surfaces disinfected   environmental surveillance performed   cohorting utilized  Goal: Optimal Comfort and Wellbeing  Outcome: Progressing  Goal: Readiness for Transition of Care  Outcome: Progressing     Problem: ARDS (Acute Respiratory Distress Syndrome)  Goal: Effective Oxygenation  Intervention: Optimize Oxygenation, Ventilation and Perfusion  Recent Flowsheet Documentation  Taken 11/3/2024 1938 by Sumaya Ramsey RN  Head of Bed (HOB) Positioning: HOB at 30-45 degrees     Problem: Comorbidity Management  Goal: Maintenance of COPD Symptom Control  Intervention: Maintain COPD Symptom Control  Recent Flowsheet Documentation  Taken 11/3/2024 1940 by Sumaya Ramsey RN  Medication Review/Management: medications reviewed  Goal: Maintenance of Heart Failure Symptom Control  Intervention: Maintain Heart Failure Management  Recent Flowsheet Documentation  Taken 11/3/2024 1940 by Sumaya Ramsey RN  Medication Review/Management: medications reviewed  Goal: Blood Pressure in Desired Range  Outcome: Adequate for Care  Transition  Intervention: Maintain Blood Pressure Management  Recent Flowsheet Documentation  Taken 11/3/2024 1940 by Sumaya Ramsey, RN  Medication Review/Management: medications reviewed     Problem: Skin Injury Risk Increased  Goal: Skin Health and Integrity  Intervention: Plan: Nurse Driven Intervention: Moisture Management  Recent Flowsheet Documentation  Taken 11/3/2024 1940 by Sumaya Ramsey, RN  Moisture Interventions: Encourage regular toileting  Intervention: Optimize Skin Protection  Recent Flowsheet Documentation  Taken 11/3/2024 1938 by Sumaya Ramsey, RN  Activity Management: up ad sisi  Head of Bed (HOB) Positioning: HOB at 30-45 degrees     Problem: Pneumonia  Goal: Fluid Balance  Outcome: Progressing  Goal: Resolution of Infection Signs and Symptoms  Outcome: Adequate for Care Transition  Intervention: Prevent Infection Progression  Recent Flowsheet Documentation  Taken 11/3/2024 1940 by Sumaya Ramsey, RN  Isolation Precautions: droplet precautions maintained  Goal: Effective Oxygenation and Ventilation  Outcome: Adequate for Care Transition  Intervention: Promote Airway Secretion Clearance  Recent Flowsheet Documentation  Taken 11/3/2024 1938 by Sumaya Ramsey, RN  Activity Management: up ad sisi  Intervention: Optimize Oxygenation and Ventilation  Recent Flowsheet Documentation  Taken 11/3/2024 1938 by Sumaya Ramsey, RN  Head of Bed (HOB) Positioning: HOB at 30-45 degrees

## 2024-11-04 NOTE — PLAN OF CARE
"Goal Outcome Evaluation:      Plan of Care Reviewed With: patient          Outcome Evaluation: A&Ox4. VSS. Up independently. O2 Sat 97% on room air. LS diminished.  Having infrequent  productive cough. Denies pain. Nausea or SOB.  Hx A Fib. On Eliquis. Abx Zithromax and IV Rocephin for PNA. On Prednisone and scheduled nebs. PIV SL      Problem: Adult Inpatient Plan of Care  Goal: Plan of Care Review  Description: The Plan of Care Review/Shift note should be completed every shift.  The Outcome Evaluation is a brief statement about your assessment that the patient is improving, declining, or no change.  This information will be displayed automatically on your shift  note.  Outcome: Adequate for Care Transition  Flowsheets (Taken 11/4/2024 1355)  Outcome Evaluation: A&Ox4. VSS. Up independently. O2 Sat 97% on room air. LS diminished.  Having infrequent  productive cough. Denies pain. Nausea or SOB.  Hx A Fib. On Eliquis. Abx Zithromax and IV Rocephin for PNA. On Prednisone and scheduled nebs. PIV SL  Plan of Care Reviewed With: patient  Goal: Patient-Specific Goal (Individualized)  Description: You can add care plan individualizations to a care plan. Examples of Individualization might be:  \"Parent requests to be called daily at 9am for status\", \"I have a hard time hearing out of my right ear\", or \"Do not touch me to wake me up as it startles  me\".  Outcome: Adequate for Care Transition  Goal: Absence of Hospital-Acquired Illness or Injury  Outcome: Adequate for Care Transition  Intervention: Identify and Manage Fall Risk  Recent Flowsheet Documentation  Taken 11/4/2024 0937 by Lety Schuster, RN  Safety Promotion/Fall Prevention:   activity supervised   clutter free environment maintained   nonskid shoes/slippers when out of bed   safety round/check completed  Intervention: Prevent Skin Injury  Recent Flowsheet Documentation  Taken 11/4/2024 0937 by Lety Schuster, RN  Body Position: position changed " independently  Intervention: Prevent and Manage VTE (Venous Thromboembolism) Risk  Recent Flowsheet Documentation  Taken 11/4/2024 0937 by Lety Schuster RN  VTE Prevention/Management: SCDs off (sequential compression devices)  Intervention: Prevent Infection  Recent Flowsheet Documentation  Taken 11/4/2024 0937 by Lety Schuster, RN  Infection Prevention:   hand hygiene promoted   personal protective equipment utilized   single patient room provided  Goal: Optimal Comfort and Wellbeing  Outcome: Adequate for Care Transition  Goal: Readiness for Transition of Care  Outcome: Adequate for Care Transition

## 2024-11-04 NOTE — PROVIDER NOTIFICATION
MD paged: Pt had melatonin at 22:10, but cannot fall asleep. Asking for a second dose of melatonin. Ok to give? Thanks.     Order placed.

## 2024-11-04 NOTE — PLAN OF CARE
"Temp: 97.7  F (36.5  C) Temp src: Oral BP: 130/70 Pulse: 89   Resp: 18 SpO2: 93 % O2 Device: None (Room air)       Orientation:  A&O x4  VS: VSS  Pain:  Denies pain  Tele:  SR  Activity: SBA   Resp:  RA  GI:  Denies nausea and vomiting  : Voiding into urinal  Skin:  WDL  Lines: PIV SL  Diet: Regular  Plan: Home  Discharge: Pending     Problem: Adult Inpatient Plan of Care  Goal: Plan of Care Review  Description: The Plan of Care Review/Shift note should be completed every shift.  The Outcome Evaluation is a brief statement about your assessment that the patient is improving, declining, or no change.  This information will be displayed automatically on your shift  note.  Outcome: Progressing  Flowsheets (Taken 11/3/2024 1825)  Outcome Evaluation: States feeling good. Ready to go home.  Plan of Care Reviewed With: patient  Overall Patient Progress: improving  Goal: Patient-Specific Goal (Individualized)  Description: You can add care plan individualizations to a care plan. Examples of Individualization might be:  \"Parent requests to be called daily at 9am for status\", \"I have a hard time hearing out of my right ear\", or \"Do not touch me to wake me up as it startles  me\".  Outcome: Progressing  Goal: Absence of Hospital-Acquired Illness or Injury  Outcome: Progressing  Intervention: Identify and Manage Fall Risk  Recent Flowsheet Documentation  Taken 11/3/2024 1631 by Jenna Ferreira RN  Safety Promotion/Fall Prevention: safety round/check completed  Intervention: Prevent Infection  Recent Flowsheet Documentation  Taken 11/3/2024 1631 by Jenna Ferreira RN  Infection Prevention:   hand hygiene promoted   rest/sleep promoted   single patient room provided  Goal: Optimal Comfort and Wellbeing  Outcome: Progressing  Goal: Readiness for Transition of Care  Outcome: Progressing       Goal Outcome Evaluation:      Plan of Care Reviewed With: patient    Overall Patient Progress: improvingOverall Patient " Progress: improving    Outcome Evaluation: States feeling good. Ready to go home.

## 2024-11-04 NOTE — PROGRESS NOTES
Patient's After Visit Summary was reviewed with patient.   Patient verbalized understanding of After Visit Summary, recommended follow up and was given an opportunity to ask questions.   Discharge medications sent home with patient/family: YES   Discharged with son-in-law    Pt discharged with all of his belongings, AVS and medications.

## 2024-11-05 ENCOUNTER — PATIENT OUTREACH (OUTPATIENT)
Dept: CARE COORDINATION | Facility: CLINIC | Age: 89
End: 2024-11-05
Payer: COMMERCIAL

## 2024-11-05 NOTE — PROGRESS NOTES
Clinic Care Coordination Contact  Transitions of Care Outreach  Chief Complaint   Patient presents with    Clinic Care Coordination - Post Hospital       Most Recent Admission Date: 11/1/2024   Most Recent Admission Diagnosis: Community acquired bacterial pneumonia - J15.9  Elevated brain natriuretic peptide (BNP) level - R79.89  Acute respiratory failure with hypoxia (H) - J96.01     Most Recent Discharge Date: 11/4/2024   Most Recent Discharge Diagnosis: Acute respiratory failure with hypoxia (H) - J96.01  Community acquired bacterial pneumonia - J15.9  Elevated brain natriuretic peptide (BNP) level - R79.89  Essential hypertension - I10  COPD exacerbation (H) - J44.1  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 [facility] and [facility], declines assistance with this. Thanks writer for the call  How are your symptoms? (Red Flag symptoms escalate to triage hotline per guidelines): Improved  Do you know how to contact your clinic care team if you have future questions or changes to your health status? : Yes  Does the patient have their discharge instructions? : Yes  Does the patient have questions regarding their discharge instructions? : No  Were you started on any new medications or were there changes to any of your previous medications? : Yes  Does the patient have all of their medications?: Yes  Do you have questions regarding any of your medications? : No  Do you have all of your needed medical supplies or equipment (DME)?  (i.e. oxygen tank, CPAP, cane, etc.): Yes    Post-op ([facility] Only)  If the patient had a surgery or procedure, do they have any questions for a nurse?: No    Post-op (Clinicians Only)  Did the  patient have surgery or a procedure: No      Follow up Plan     Discharge Follow-Up  Discharge follow up appointment scheduled in alignment with recommended follow up timeframe or Transitions of Risk Category? (Low = within 30 days; Moderate= within 14 days; High= within 7 days): No  Patient's follow up appointment not scheduled: Patient declined scheduling support. Education on the importance of transitions of care follow up. Provided scheduling phone number. (Patient will call to schedule)    No future appointments.    Outpatient Plan as outlined on AVS reviewed with patient.    For any urgent concerns, please contact our 24 hour nurse triage line: [phone] ([phone])       THA Louis

## 2024-11-06 LAB — BACTERIA BLD CULT: NO GROWTH

## 2024-11-11 ENCOUNTER — APPOINTMENT (OUTPATIENT)
Dept: GENERAL RADIOLOGY | Facility: CLINIC | Age: 89
End: 2024-11-11
Payer: COMMERCIAL

## 2024-11-11 ENCOUNTER — HOSPITAL ENCOUNTER (OUTPATIENT)
Facility: CLINIC | Age: 89
Setting detail: OBSERVATION
Discharge: HOME | End: 2024-11-11
Attending: EMERGENCY MEDICINE | Admitting: HOSPITALIST
Payer: COMMERCIAL

## 2024-11-11 VITALS
DIASTOLIC BLOOD PRESSURE: 72 MMHG | BODY MASS INDEX: 20.3 KG/M2 | WEIGHT: 145 LBS | SYSTOLIC BLOOD PRESSURE: 92 MMHG | HEART RATE: 75 BPM | RESPIRATION RATE: 17 BRPM | OXYGEN SATURATION: 98 % | TEMPERATURE: 97.8 F | HEIGHT: 71 IN

## 2024-11-11 DIAGNOSIS — R05.8 PRODUCTIVE COUGH: ICD-10-CM

## 2024-11-11 DIAGNOSIS — R53.1 GENERALIZED WEAKNESS: ICD-10-CM

## 2024-11-11 DIAGNOSIS — J44.1 COPD EXACERBATION (H): Primary | ICD-10-CM

## 2024-11-11 DIAGNOSIS — E86.1 HYPOTENSION DUE TO HYPOVOLEMIA: ICD-10-CM

## 2024-11-11 LAB
ALBUMIN SERPL BCG-MCNC: 3.5 G/DL (ref 3.5–5.2)
ALP SERPL-CCNC: 85 U/L (ref 40–150)
ALT SERPL W P-5'-P-CCNC: 17 U/L (ref 0–70)
ANION GAP SERPL CALCULATED.3IONS-SCNC: 13 MMOL/L (ref 7–15)
AST SERPL W P-5'-P-CCNC: 22 U/L (ref 0–45)
ATRIAL RATE - MUSE: NORMAL BPM
BASOPHILS # BLD AUTO: 0 10E3/UL (ref 0–0.2)
BASOPHILS NFR BLD AUTO: 0 %
BILIRUB SERPL-MCNC: 0.8 MG/DL
BUN SERPL-MCNC: 25.9 MG/DL (ref 8–23)
C PNEUM DNA SPEC QL NAA+PROBE: NOT DETECTED
CALCIUM SERPL-MCNC: 8.5 MG/DL (ref 8.8–10.4)
CHLORIDE SERPL-SCNC: 103 MMOL/L (ref 98–107)
CREAT SERPL-MCNC: 1.57 MG/DL (ref 0.67–1.17)
DIASTOLIC BLOOD PRESSURE - MUSE: NORMAL MMHG
EGFRCR SERPLBLD CKD-EPI 2021: 42 ML/MIN/1.73M2
EOSINOPHIL # BLD AUTO: 0 10E3/UL (ref 0–0.7)
EOSINOPHIL NFR BLD AUTO: 0 %
ERYTHROCYTE [DISTWIDTH] IN BLOOD BY AUTOMATED COUNT: 15.9 % (ref 10–15)
FLUAV H1 2009 PAND RNA SPEC QL NAA+PROBE: NOT DETECTED
FLUAV H1 RNA SPEC QL NAA+PROBE: NOT DETECTED
FLUAV H3 RNA SPEC QL NAA+PROBE: NOT DETECTED
FLUAV RNA SPEC QL NAA+PROBE: NEGATIVE
FLUAV RNA SPEC QL NAA+PROBE: NOT DETECTED
FLUBV RNA RESP QL NAA+PROBE: NEGATIVE
FLUBV RNA SPEC QL NAA+PROBE: NOT DETECTED
GLUCOSE SERPL-MCNC: 119 MG/DL (ref 70–99)
HADV DNA SPEC QL NAA+PROBE: NOT DETECTED
HCO3 SERPL-SCNC: 23 MMOL/L (ref 22–29)
HCOV PNL SPEC NAA+PROBE: NOT DETECTED
HCT VFR BLD AUTO: 42.9 % (ref 40–53)
HGB BLD-MCNC: 14 G/DL (ref 13.3–17.7)
HMPV RNA SPEC QL NAA+PROBE: NOT DETECTED
HPIV1 RNA SPEC QL NAA+PROBE: NOT DETECTED
HPIV2 RNA SPEC QL NAA+PROBE: NOT DETECTED
HPIV3 RNA SPEC QL NAA+PROBE: NOT DETECTED
HPIV4 RNA SPEC QL NAA+PROBE: NOT DETECTED
IMM GRANULOCYTES # BLD: 0 10E3/UL
IMM GRANULOCYTES NFR BLD: 1 %
INTERPRETATION ECG - MUSE: NORMAL
LYMPHOCYTES # BLD AUTO: 1.1 10E3/UL (ref 0.8–5.3)
LYMPHOCYTES NFR BLD AUTO: 14 %
M PNEUMO DNA SPEC QL NAA+PROBE: NOT DETECTED
MCH RBC QN AUTO: 31.3 PG (ref 26.5–33)
MCHC RBC AUTO-ENTMCNC: 32.6 G/DL (ref 31.5–36.5)
MCV RBC AUTO: 96 FL (ref 78–100)
MONOCYTES # BLD AUTO: 1.3 10E3/UL (ref 0–1.3)
MONOCYTES NFR BLD AUTO: 15 %
NEUTROPHILS # BLD AUTO: 5.7 10E3/UL (ref 1.6–8.3)
NEUTROPHILS NFR BLD AUTO: 70 %
NRBC # BLD AUTO: 0 10E3/UL
NRBC BLD AUTO-RTO: 0 /100
NT-PROBNP SERPL-MCNC: 2205 PG/ML (ref 0–1800)
P AXIS - MUSE: NORMAL DEGREES
PLATELET # BLD AUTO: 147 10E3/UL (ref 150–450)
POTASSIUM SERPL-SCNC: 4.1 MMOL/L (ref 3.4–5.3)
PR INTERVAL - MUSE: NORMAL MS
PROCALCITONIN SERPL IA-MCNC: 0.06 NG/ML
PROT SERPL-MCNC: 6.2 G/DL (ref 6.4–8.3)
QRS DURATION - MUSE: 86 MS
QT - MUSE: 376 MS
QTC - MUSE: 447 MS
R AXIS - MUSE: 67 DEGREES
RBC # BLD AUTO: 4.48 10E6/UL (ref 4.4–5.9)
RSV RNA SPEC NAA+PROBE: NEGATIVE
RSV RNA SPEC QL NAA+PROBE: NOT DETECTED
RSV RNA SPEC QL NAA+PROBE: NOT DETECTED
RV+EV RNA SPEC QL NAA+PROBE: DETECTED
SARS-COV-2 RNA RESP QL NAA+PROBE: NEGATIVE
SODIUM SERPL-SCNC: 139 MMOL/L (ref 135–145)
SYSTOLIC BLOOD PRESSURE - MUSE: NORMAL MMHG
T AXIS - MUSE: 65 DEGREES
TROPONIN T SERPL HS-MCNC: 43 NG/L
TROPONIN T SERPL HS-MCNC: 49 NG/L
VENTRICULAR RATE- MUSE: 85 BPM
WBC # BLD AUTO: 8.2 10E3/UL (ref 4–11)

## 2024-11-11 PROCEDURE — 83880 ASSAY OF NATRIURETIC PEPTIDE: CPT

## 2024-11-11 PROCEDURE — 84145 PROCALCITONIN (PCT): CPT | Performed by: PHYSICIAN ASSISTANT

## 2024-11-11 PROCEDURE — 84484 ASSAY OF TROPONIN QUANT: CPT | Performed by: PHYSICIAN ASSISTANT

## 2024-11-11 PROCEDURE — 85004 AUTOMATED DIFF WBC COUNT: CPT

## 2024-11-11 PROCEDURE — 71046 X-RAY EXAM CHEST 2 VIEWS: CPT

## 2024-11-11 PROCEDURE — 250N000012 HC RX MED GY IP 250 OP 636 PS 637: Performed by: PHYSICIAN ASSISTANT

## 2024-11-11 PROCEDURE — 250N000013 HC RX MED GY IP 250 OP 250 PS 637: Performed by: HOSPITALIST

## 2024-11-11 PROCEDURE — 96360 HYDRATION IV INFUSION INIT: CPT

## 2024-11-11 PROCEDURE — 258N000003 HC RX IP 258 OP 636: Performed by: PHYSICIAN ASSISTANT

## 2024-11-11 PROCEDURE — 82040 ASSAY OF SERUM ALBUMIN: CPT

## 2024-11-11 PROCEDURE — 99285 EMERGENCY DEPT VISIT HI MDM: CPT | Mod: 25

## 2024-11-11 PROCEDURE — 250N000013 HC RX MED GY IP 250 OP 250 PS 637: Performed by: PHYSICIAN ASSISTANT

## 2024-11-11 PROCEDURE — G0378 HOSPITAL OBSERVATION PER HR: HCPCS

## 2024-11-11 PROCEDURE — 93005 ELECTROCARDIOGRAM TRACING: CPT

## 2024-11-11 PROCEDURE — 99207 PR NO BILLABLE SERVICE THIS VISIT: CPT | Performed by: PHYSICIAN ASSISTANT

## 2024-11-11 PROCEDURE — 36415 COLL VENOUS BLD VENIPUNCTURE: CPT

## 2024-11-11 PROCEDURE — 99236 HOSP IP/OBS SAME DATE HI 85: CPT | Performed by: PHYSICIAN ASSISTANT

## 2024-11-11 PROCEDURE — 258N000003 HC RX IP 258 OP 636

## 2024-11-11 PROCEDURE — 87486 CHLMYD PNEUM DNA AMP PROBE: CPT

## 2024-11-11 PROCEDURE — 84484 ASSAY OF TROPONIN QUANT: CPT

## 2024-11-11 PROCEDURE — 87637 SARSCOV2&INF A&B&RSV AMP PRB: CPT

## 2024-11-11 PROCEDURE — 96361 HYDRATE IV INFUSION ADD-ON: CPT

## 2024-11-11 RX ORDER — PANTOPRAZOLE SODIUM 40 MG/1
40 TABLET, DELAYED RELEASE ORAL 2 TIMES DAILY
Status: DISCONTINUED | OUTPATIENT
Start: 2024-11-11 | End: 2024-11-12 | Stop reason: HOSPADM

## 2024-11-11 RX ORDER — ONDANSETRON 4 MG/1
4 TABLET, ORALLY DISINTEGRATING ORAL EVERY 6 HOURS PRN
Status: DISCONTINUED | OUTPATIENT
Start: 2024-11-11 | End: 2024-11-12 | Stop reason: HOSPADM

## 2024-11-11 RX ORDER — SODIUM CHLORIDE 9 MG/ML
INJECTION, SOLUTION INTRAVENOUS CONTINUOUS
Status: DISCONTINUED | OUTPATIENT
Start: 2024-11-11 | End: 2024-11-12 | Stop reason: HOSPADM

## 2024-11-11 RX ORDER — LOSARTAN POTASSIUM 100 MG/1
100 TABLET ORAL DAILY
Status: DISCONTINUED | OUTPATIENT
Start: 2024-11-12 | End: 2024-11-12 | Stop reason: HOSPADM

## 2024-11-11 RX ORDER — GUAIFENESIN/DEXTROMETHORPHAN 100-10MG/5
10 SYRUP ORAL EVERY 4 HOURS PRN
Status: DISCONTINUED | OUTPATIENT
Start: 2024-11-11 | End: 2024-11-12 | Stop reason: HOSPADM

## 2024-11-11 RX ORDER — OXYCODONE AND ACETAMINOPHEN 5; 325 MG/1; MG/1
1 TABLET ORAL 2 TIMES DAILY PRN
Status: DISCONTINUED | OUTPATIENT
Start: 2024-11-11 | End: 2024-11-11

## 2024-11-11 RX ORDER — AMOXICILLIN 250 MG
2 CAPSULE ORAL 2 TIMES DAILY PRN
Status: DISCONTINUED | OUTPATIENT
Start: 2024-11-11 | End: 2024-11-12 | Stop reason: HOSPADM

## 2024-11-11 RX ORDER — AMLODIPINE BESYLATE 5 MG/1
10 TABLET ORAL DAILY
Status: DISCONTINUED | OUTPATIENT
Start: 2024-11-12 | End: 2024-11-12 | Stop reason: HOSPADM

## 2024-11-11 RX ORDER — PREDNISONE 20 MG/1
40 TABLET ORAL DAILY
Qty: 10 TABLET | Refills: 0 | Status: SHIPPED | OUTPATIENT
Start: 2024-11-12 | End: 2024-11-17

## 2024-11-11 RX ORDER — METOPROLOL TARTRATE 50 MG
50 TABLET ORAL 2 TIMES DAILY
Status: DISCONTINUED | OUTPATIENT
Start: 2024-11-11 | End: 2024-11-12 | Stop reason: HOSPADM

## 2024-11-11 RX ORDER — AMOXICILLIN 250 MG
1 CAPSULE ORAL 2 TIMES DAILY PRN
Status: DISCONTINUED | OUTPATIENT
Start: 2024-11-11 | End: 2024-11-12 | Stop reason: HOSPADM

## 2024-11-11 RX ORDER — POLYETHYLENE GLYCOL 3350 17 G/17G
17 POWDER, FOR SOLUTION ORAL 2 TIMES DAILY PRN
Status: DISCONTINUED | OUTPATIENT
Start: 2024-11-11 | End: 2024-11-12 | Stop reason: HOSPADM

## 2024-11-11 RX ORDER — ALBUTEROL SULFATE 0.83 MG/ML
2.5 SOLUTION RESPIRATORY (INHALATION) EVERY 6 HOURS PRN
Status: DISCONTINUED | OUTPATIENT
Start: 2024-11-11 | End: 2024-11-12 | Stop reason: HOSPADM

## 2024-11-11 RX ORDER — PREDNISONE 20 MG/1
40 TABLET ORAL DAILY
Status: DISCONTINUED | OUTPATIENT
Start: 2024-11-11 | End: 2024-11-12 | Stop reason: HOSPADM

## 2024-11-11 RX ORDER — ONDANSETRON 2 MG/ML
4 INJECTION INTRAMUSCULAR; INTRAVENOUS EVERY 6 HOURS PRN
Status: DISCONTINUED | OUTPATIENT
Start: 2024-11-11 | End: 2024-11-12 | Stop reason: HOSPADM

## 2024-11-11 RX ORDER — ACETAMINOPHEN 325 MG/1
650 TABLET ORAL EVERY 4 HOURS PRN
Status: DISCONTINUED | OUTPATIENT
Start: 2024-11-11 | End: 2024-11-12 | Stop reason: HOSPADM

## 2024-11-11 RX ORDER — LIDOCAINE 40 MG/G
CREAM TOPICAL
Status: DISCONTINUED | OUTPATIENT
Start: 2024-11-11 | End: 2024-11-12 | Stop reason: HOSPADM

## 2024-11-11 RX ORDER — ACETAMINOPHEN 650 MG/1
650 SUPPOSITORY RECTAL EVERY 4 HOURS PRN
Status: DISCONTINUED | OUTPATIENT
Start: 2024-11-11 | End: 2024-11-12 | Stop reason: HOSPADM

## 2024-11-11 RX ADMIN — PANTOPRAZOLE SODIUM 40 MG: 40 TABLET, DELAYED RELEASE ORAL at 20:28

## 2024-11-11 RX ADMIN — SODIUM CHLORIDE: 9 INJECTION, SOLUTION INTRAVENOUS at 20:37

## 2024-11-11 RX ADMIN — PREDNISONE 40 MG: 20 TABLET ORAL at 20:28

## 2024-11-11 RX ADMIN — APIXABAN 2.5 MG: 2.5 TABLET, FILM COATED ORAL at 20:28

## 2024-11-11 RX ADMIN — SODIUM CHLORIDE 1000 ML: 9 INJECTION, SOLUTION INTRAVENOUS at 13:15

## 2024-11-11 ASSESSMENT — ACTIVITIES OF DAILY LIVING (ADL)
ADLS_ACUITY_SCORE: 0

## 2024-11-11 ASSESSMENT — COLUMBIA-SUICIDE SEVERITY RATING SCALE - C-SSRS
1. IN THE PAST MONTH, HAVE YOU WISHED YOU WERE DEAD OR WISHED YOU COULD GO TO SLEEP AND NOT WAKE UP?: NO
2. HAVE YOU ACTUALLY HAD ANY THOUGHTS OF KILLING YOURSELF IN THE PAST MONTH?: NO
6. HAVE YOU EVER DONE ANYTHING, STARTED TO DO ANYTHING, OR PREPARED TO DO ANYTHING TO END YOUR LIFE?: NO

## 2024-11-11 NOTE — PROGRESS NOTES
"[facility] TRANSPORT NOTE  Data:   Reason for Transport:  To the unit    Crow Salinas was transported from ED via cart at 1745.  Patient was accompanied by Transport Aide. Equipment used for transport: None. Family was aware of reason for transport: yes    Action:  Report: received via ED handoff report    Response:  Patient's condition when transferred off unit was AXO4. And in no acute distress. Able to make needs known. RA. Denies pain.    /73   Pulse 85   Temp 97.8  F (36.6  C) (Oral)   Resp 17   Ht 1.803 m (5' 11\")   Wt 65.8 kg (145 lb)   SpO2 98%   BMI 20.22 kg/m        Aparna Talavera RN  "

## 2024-11-11 NOTE — H&P
[facility]    History and Physical - Hospitalist Service       Date of Admission:  11/11/2024    Assessment & Plan      Crow Salinas is a 89 year old male with PMHx significant for COPD, diastolic CHF, afib on DOAC, HTN, HLP, CKD, pulmonary HTN, AAA, chronic back pain and ongoing nicotine dependence, who was admitted on 11/11/2024 with ongoing cough, SOB and generalized weakness.     1. Ongoing cough and SOB  Recent COPD exacerbation due to rhinovirus infection with possible CAP  Pt discharged from the hospital on 11/4 with 2 more days of Azithromycin and 2 more days of Prednisone. Sx had improved and he weaned off O2 prior to discharge. Since completing Prednisone, sx have worsened. Notes ongoing productive cough and SOB with exertion. Denies fever or chest pain. Notes occasional wheezing as well.   Notes poor sleep since discharge due to his wife having dementia, poor appetite with minimal intake. Notes generalized weakness, does not feel safe to discharge home.  Mild hypotension initially, BP 84/53, VS otherwise stable. No hypoxia. CMP significant for slightly elevated Cr and BUN, otherwise unremarkable. CBC unremarkable. BNP elevated at 2200 and troponin 49-->43 on recheck. Covid, RSV and influenza negative. Respiratory panel pending. CXR shows improvement in previous R lower lung opacity, no new focal consolidation.  He was given 1L NS bolus in ED.   Suspect ongoing sx from COPD exacerbation related to rhinovirus infection.  - admit to OBS  - start Prednisone 40 mg x 5 days with taper  - continue PRN albuterol nebs  - continue Robitussin DM  - PT/SW consults  - give gentle IVFs over 10 hrs  - PRN O2  - low suspicion for CHF exacerbation  - add on procalcitonin, will hold off on further abx for now    2. MITZI on CKD stage IIIb  Cr 1.57, baseline is likely closer to 1.2-1.3. Likely due to poor po intake since discharge home  - gentle IVFs overnight  - recheck labs in AM    3. Diastolic  CHF  Afib on DOAC  HTN  HLP  Pulmonary HTN  Euvolemic if not mildly hypovolemic given mild MITZI.  - resume home Eliquis  - resume home metoprolol  - resume home amlodipine and losartan with parameters  - resume atorvastatin on discharge due to OBS status  - hold sildenafil     4. Chronic back pain   Uses occasional oxycodone, resume  5. Ongoing nicotine dependence  Pt endorses sporadic nicotine use        Diet:  regular diet  DVT Prophylaxis: DOAC  Mckeon Catheter: Not present  Lines: None     Cardiac Monitoring: None  Code Status:  DNR/DNI    Clinically Significant Risk Factors Present on Admission           # Hypocalcemia: Lowest Ca = 8.5 mg/dL in last 2 days, will monitor and replace as appropriate      # Drug Induced Coagulation Defect: home medication list includes an anticoagulant medication    # Hypertension: Noted on problem list                      Disposition Plan     Medically Ready for Discharge: Anticipated Tomorrow         The patient's care was discussed with the Patient and ED provider, Dr. Velazco .    Anh Vivar PA-C  Hospitalist Service  [facility]  Securely message with [employer] (more info)  Text page via [employer] Paging/Directory     ______________________________________________________________________    Chief Complaint   Cough, weakness    History is obtained from the patient    History of Present Illness   Crow Salinas is a 89 year old male with PMHx significant for COPD, diastolic CHF, afib on DOAC, HTN, HLP, CKD, pulmonary HTN, AAA, chronic back pain and ongoing nicotine dependence, who presents to the ED with ongoing productive cough, SOB with exertion and generalized weakness. He was hospitalized here 11/1-11/4 for acute respiratory failure due to COPD exacerbation and CAP related to rhinovirus infection. He was treated with abx and Prednisone. He was weaned off O2 prior to discharge. His symptoms improved but quickly worsened once he completed the abx and  steroids. He now complains of ongoing productive cough and SOB with exertion. He notes he has not eaten well since discharge and he has not slept well as he lives with his wife who has dementia and she has been keeping him up at night. He has becomes very weak and feels unsafe at home. He denies fever, chills, chest pain, nausea or vomiting. He did endorse some diarrhea that is improving.       Past Medical History    Past Medical History:   Diagnosis Date    A-fib (H)        Past Surgical History   No significant PSHx  S/p cholecystectomy     Prior to Admission Medications   Prior to Admission Medications   Prescriptions Last Dose Informant Patient Reported? Taking?   Apoaequorin (PREVAGEN PO)   Yes No   Sig: Take 1 capsule by mouth daily.   MELATONIN PO   Yes No   Sig: Take 1 tablet by mouth nightly as needed (sleep).   acetaminophen (TYLENOL) 500 MG tablet   Yes No   Sig: Take 500 mg by mouth every 4 hours as needed for mild pain.   albuterol (PROVENTIL) (2.5 MG/3ML) 0.083% neb solution   Yes No   Sig: Take 2.5 mg by nebulization every 6 hours as needed for shortness of breath, wheezing or cough.   amLODIPine (NORVASC) 10 MG tablet   Yes No   Sig: Take 10 mg by mouth daily.   apixaban ANTICOAGULANT (ELIQUIS) 5 MG tablet   Yes No   Sig: Take 5 mg by mouth 2 times daily.   atorvastatin (LIPITOR) 10 MG tablet   Yes No   Sig: Take 10 mg by mouth daily.   benzocaine-menthol (CHLORASEPTIC) 6-10 MG lozenge   No No   Sig: Place 1 lozenge inside cheek every 3 hours as needed for sore throat.   cyanocobalamin (VITAMIN B-12) 1000 MCG tablet   Yes No   Sig: Take 1,000 mcg by mouth daily.   cyclobenzaprine (FLEXERIL) 10 MG tablet   Yes No   Sig: Take 10 mg by mouth 3 times daily as needed for muscle spasms.   docusate sodium (COLACE) 100 MG capsule   Yes No   Sig: Take 100 mg by mouth daily.   guaiFENesin (ROBITUSSIN) 20 mg/mL liquid   No No   Sig: Take 10 mLs (200 mg) by mouth every 4 hours as needed for other (secretion  expectorant).   losartan (COZAAR) 100 MG tablet   Yes No   Sig: Take 100 mg by mouth daily.   metoprolol tartrate (LOPRESSOR) 100 MG tablet   No No   Sig: Take 0.5 tablets (50 mg) by mouth 2 times daily.   multivitamin w/minerals (MULTI-VITAMIN) tablet   Yes No   Sig: Take 1 tablet by mouth daily.   omeprazole (PRILOSEC) 20 MG DR capsule   Yes No   Sig: Take 20 mg by mouth 2 times daily.   oxyCODONE-acetaminophen (PERCOCET) 5-325 MG tablet   No No   Sig: Take 1 tablet by mouth 2 times daily as needed for severe pain. 1/2 tablet in morning as needed  1/4-1/2 tablet in evening as needed   sildenafil (VIAGRA) 100 MG tablet   No No   Sig: Take 1 tablet (100 mg) by mouth daily as needed.   sodium chloride (OCEAN) 0.65 % nasal spray   No No   Sig: Spray 1 spray into both nostrils every hour as needed for congestion.      Facility-Administered Medications: None           Physical Exam   Vital Signs: Temp: 97.6  F (36.4  C) Temp src: Temporal BP: 113/69 Pulse: 80   Resp: 17 SpO2: 98 % O2 Device: None (Room air)    Weight: 145 lbs 0 oz    GENERAL:  Comfortable.  PSYCH: pleasant, oriented, No acute distress.  HEENT:  Atraumatic, normocephalic. Normal conjunctiva, normal hearing, and oropharynx is normal.  NECK:  Supple, no neck vein distention  HEART:  Normal S1, S2 with no murmur, no pericardial rub, gallops or S3 or S4.  LUNGS:  Clear to auscultation, normal Respiratory effort. No wheezing, rales or ronchi.  GI:  Soft, normal bowel sounds. Non-tender, non distended.   EXTREMITIES:  No pedal edema, +2 pulses bilateral and equal.  SKIN:  Dry to touch, No rash, wound or ulcerations.  NEUROLOGIC:  CN 2-12 intact, BL 5/5 symmetric upper and lower extremity strength, sensation is intact with no focal deficits.      Medical Decision Making       65 MINUTES SPENT BY ME on the date of service doing chart review, history, exam, documentation & further activities per the note.      Data     I have personally reviewed the following  data over the past 24 hrs:    8.2  \   14.0   / 147 (L)     139 103 25.9 (H) /  119 (H)   4.1 23 1.57 (H) \     ALT: 17 AST: 22 AP: 85 TBILI: 0.8   ALB: 3.5 TOT PROTEIN: 6.2 (L) LIPASE: N/A     Trop: 43 (H) BNP: 2,205 (H)       Imaging results reviewed over the past 24 hrs:   Recent Results (from the past 24 hours)   XR Chest 2 Views    Narrative    CHEST TWO VIEWS 11/11/2024 1:29 PM     HISTORY: SOB    COMPARISON: Chest radiograph 11/1/2024      Impression    IMPRESSION: Significant improved right lower lung opacity. No new  focal consolidation, significant pleural effusion or pneumothorax.  Similar background of interstitial change. Heart size is normal.    BUD PENA MD         SYSTEM ID:  [id number]

## 2024-11-11 NOTE — ED PROVIDER NOTES
Emergency Department Note      History of Present Illness     Chief Complaint   Shortness of Breath      HPI     Crow Salinas is a 89 year old male w/PMH severe COPD, nicotine depencence, afib on DOAC, diastolic HF, HTN, DLD, CKD, pulmonary HTN, AAA, chronic back pain with narcotic dependence who presents to the ED for SOB and cough. He was admitted on 11/1/2024 for Human rhinovirus/enterovirus infection and acute hypoxic respiratory failure improved with steroid and supplement oxygen. He was discharge on 11/04 with prednisone and azithromycin, which he has completed a 5 day course. Reports symptoms initially improved on day one after discharge but since SOB and cough has worsen 2/2 to increase mucus production. He also endorses loss of appetite, fatigue, and poor oral intake. He does not use oxygen at home. He was seen in clinic this morning and was recommended to head the ED for further assessment. Denies fevers, chills, chest pain, nausea or vomiting.         Independent Historian   None    Review of External Notes   11/01 ED and hospitalization notes.     Past Medical History     Medical History and Problem List   Past Medical History:   Diagnosis Date    A-fib (H)        Medications   acetaminophen (TYLENOL) 500 MG tablet  albuterol (PROVENTIL) (2.5 MG/3ML) 0.083% neb solution  amLODIPine (NORVASC) 10 MG tablet  apixaban ANTICOAGULANT (ELIQUIS) 5 MG tablet  Apoaequorin (PREVAGEN PO)  atorvastatin (LIPITOR) 10 MG tablet  benzocaine-menthol (CHLORASEPTIC) 6-10 MG lozenge  cyanocobalamin (VITAMIN B-12) 1000 MCG tablet  cyclobenzaprine (FLEXERIL) 10 MG tablet  docusate sodium (COLACE) 100 MG capsule  guaiFENesin (ROBITUSSIN) 20 mg/mL liquid  losartan (COZAAR) 100 MG tablet  MELATONIN PO  metoprolol tartrate (LOPRESSOR) 100 MG tablet  multivitamin w/minerals (MULTI-VITAMIN) tablet  omeprazole (PRILOSEC) 20 MG DR capsule  oxyCODONE-acetaminophen (PERCOCET) 5-325 MG tablet  sildenafil (VIAGRA) 100 MG  "tablet  sodium chloride (OCEAN) 0.65 % nasal spray        Surgical History   No past surgical history on file.    Physical Exam     Patient Vitals for the past 24 hrs:   BP Temp Temp src Pulse Resp SpO2 Height Weight   11/11/24 1313 98/70 -- -- 92 -- -- -- --   11/11/24 1156 (!) 84/53 97.6  F (36.4  C) Temporal 84 17 95 % 1.803 m (5' 11\") 65.8 kg (145 lb)     Physical Exam  Constitutional:       General: He is not in acute distress.     Appearance: He is not ill-appearing.   HENT:      Head: Normocephalic and atraumatic.      Mouth/Throat:      Mouth: Mucous membranes are dry.      Pharynx: No oropharyngeal exudate.      Tonsils: No tonsillar exudate.   Eyes:      Extraocular Movements: Extraocular movements intact.      Pupils: Pupils are equal, round, and reactive to light.   Cardiovascular:      Rate and Rhythm: Normal rate and regular rhythm.      Pulses: Normal pulses.      Heart sounds: Normal heart sounds.   Pulmonary:      Effort: Pulmonary effort is normal. No tachypnea or respiratory distress.      Breath sounds: Normal breath sounds. No wheezing or rhonchi.   Abdominal:      General: Bowel sounds are normal.      Palpations: Abdomen is soft.   Musculoskeletal:      Right lower leg: No edema.      Left lower leg: No edema.   Skin:     General: Skin is warm and dry.   Neurological:      General: No focal deficit present.      Mental Status: He is alert and oriented to person, place, and time.   Psychiatric:         Mood and Affect: Mood normal.         Behavior: Behavior normal.           Diagnostics     Lab Results   Labs Ordered and Resulted from Time of ED Arrival to Time of ED Departure   COMPREHENSIVE METABOLIC PANEL - Abnormal       Result Value    Sodium 139      Potassium 4.1      Carbon Dioxide (CO2) 23      Anion Gap 13      Urea Nitrogen 25.9 (*)     Creatinine 1.57 (*)     GFR Estimate 42 (*)     Calcium 8.5 (*)     Chloride 103      Glucose 119 (*)     Alkaline Phosphatase 85      AST 22      " ALT 17      Protein Total 6.2 (*)     Albumin 3.5      Bilirubin Total 0.8     NT PROBNP INPATIENT - Abnormal    N terminal Pro BNP Inpatient 2,205 (*)    TROPONIN T, HIGH SENSITIVITY - Abnormal    Troponin T, High Sensitivity 49 (*)    CBC WITH PLATELETS AND DIFFERENTIAL - Abnormal    WBC Count 8.2      RBC Count 4.48      Hemoglobin 14.0      Hematocrit 42.9      MCV 96      MCH 31.3      MCHC 32.6      RDW 15.9 (*)     Platelet Count 147 (*)     % Neutrophils 70      % Lymphocytes 14      % Monocytes 15      % Eosinophils 0      % Basophils 0      % Immature Granulocytes 1      NRBCs per 100 WBC 0      Absolute Neutrophils 5.7      Absolute Lymphocytes 1.1      Absolute Monocytes 1.3      Absolute Eosinophils 0.0      Absolute Basophils 0.0      Absolute Immature Granulocytes 0.0      Absolute NRBCs 0.0     INFLUENZA A/B, RSV, & SARS-COV2 PCR - Normal    Influenza A PCR Negative      Influenza B PCR Negative      RSV PCR Negative      SARS CoV2 PCR Negative     TROPONIN T, HIGH SENSITIVITY   RESPIRATORY PANEL PCR       Imaging   XR Chest 2 Views   Final Result   IMPRESSION: Significant improved right lower lung opacity. No new   focal consolidation, significant pleural effusion or pneumothorax.   Similar background of interstitial change. Heart size is normal.      BUD PENA MD            SYSTEM ID:  [id number]          EKG   ECG taken at 0159   Atrial fibrillation, when compared with ECG of 02-Nov-2024 02:21,   No significant changes was found as compared to prior, dated 11/02/24.  Rate 85 bpm. QRS duration 86 ms. QT/Qtc 376/447 ms. P-R-T axes - 6-7 65.    Independent Interpretation   None    ED Course      Medications Administered   Medications   sodium chloride 0.9% BOLUS 1,000 mL (1,000 mLs Intravenous $New Bag 11/11/24 3801)     Procedures   Procedures     Discussion of Management   Admitting Hospitalist, sign out to hospitalist team    ED Course        Additional Documentation  None    Medical  Decision Making / Diagnosis     CMS Diagnoses: None    MIPS       None    MDM   Crow Salinas is a 89 year old male w/PMH severe COPD, nicotine depencence, afib on DOAC, diastolic HF, HTN, DLD, CKD, pulmonary HTN, and AAA presents to ED for worsening dyspnea and cough. Recently discharge with similar presentation treated for acute hypoxic respiratory failure 2/2 rhinovirus and possible secondary bacterial pneumonia. Completed a 5 days course of steroid and abx, still with worsening symptoms. Ddx includes pneumonia, bronchitis, viral illness, CHF, COPD, among other etiologies. History & exam concerning for pneumonia, however CXR with significant improved right lower lung opacity. No new consolidation or significant pleural effusion or pneumothorax. WBC wnl. Trop elevated at 49 and BNP elevated at 2205 suggest possible heart failure. Examination reassuring against fluid overload. EKG showing atrial fibrillation, similar to previous EKG. Influenza/COVID/RSV negative. Symptoms may also be attributed 2/2 dehydration. BP low at 84/53 upon admission, likely secondary to poor oral intake, which improve with fluids. Cr elevated at 1.57 (BL 1.20-1.30).  No evidence of sepsis/severe sepsis. Daughter and patient concerns with generalized weakness and SOB. With worsening symptoms and generalized weakness patient is unsafe for discharge. Admission for observation indicated for further eval/tx. Discussed with hospitalist service, who accepts pt for admission. Pt/daughter in agreement with plan. All questions answered.       Disposition   The patient was admitted to the hospital.     Diagnosis     ICD-10-CM    1. Hypotension due to hypovolemia  E86.1       2. Generalized weakness  R53.1       3. Productive cough  R05.8            Discharge Medications   New Prescriptions    No medications on file         MD Mora Grissom, Lucas PACHECO MD  Resident  11/11/24 1756

## 2024-11-11 NOTE — ED TRIAGE NOTES
Pt complains of ongoing sob. Pt was recently admitted for pneumonia and states he is not feeling any better. Pt was discharged from hospital on 11/4. No cp.

## 2024-11-11 NOTE — ED PROVIDER NOTES
ED MEDICAL RESIDENT SUPERVISION NOTE:   I evaluated this patient in conjunction with Lucas Velazco MD. I have participated in the care of the patient and personally performed key elements of the history, exam, and medical decision making.      HPI:   Crow Salinas is a 89 year old male, on Eliquis, with a history of AAA, CHF, stage 3 CKD, COPD, hypertension, paroxysmal atrial fibrillation, and type II diabetes who presents for evaluation of shortness of breath. The patient reports that since finishing prednisone and azithromycin after being discharged from the hospital on 11/4, he was feeling better but is now having more cough and mucus production. States that he has episodes of shortness of breath and has poor appetite and fluid intake. He saw his primary provider today who told him to come back to the emergency department. He is not on supplemental oxygen at baseline. Denies chest pain.     Independent Historian:   Daughter as detailed above.    Review of External Notes: I reviewed hospital discharge summary from 11/4/24 when he was admitted for rhinovirus, CHF, and COPD exacerbation. I also reviewed the primary provider note from today when he was seen for his symptoms and recommended to present to the emergency department for reevaluation.       EXAM:   General: the patient is awake and interactive  HEENT:  Moist mucous membranes, conjunctiva normal  Pulmonary:  Normal respiratory effort  Cardiovascular:  Well perfused  Musculoskeletal:  Moving 4 extremities grossly wnl, no deformities  Neuro:  Speech normal, no focal deficits  Psych:  Normal affect     Independent Interpretation (X-rays, CTs, rhythm strip):  CXR: No pneumothorax, infiltrate, pleural effusion, or pulmonary edema.       MEDICAL DECISION MAKING/ASSESSMENT AND PLAN:   89-year-old male with history of COPD, A-fib on DOAC, HFpEF, hypertension presenting to the ER for evaluation of generalized weakness with shortness of breath and cough.  Please  see above for the details in HPI and exam.  Patient is afebrile, hypertensive on arrival otherwise well-appearing.  He is not hypoxic or in respiratory distress.  He was recently discharged 11/4 for CHF and COPD exacerbation and had a viral panel positive for rhinovirus.  Screening EKG shows known atrial fibrillation without acute ischemic appearing changes. High-sensitivity troponin is detectable and 2-hour troponin remains flat.  He denies chest pain and I doubt ACS.  Doubt PE as patient has been on DOAC and has been compliant.  Chest x-ray shows no evidence of pneumothorax or other acute findings and appears improved from prior chest x-ray.  BNP is improved from prior evaluation and he actually appears dry, likely owing to poor oral intake and dehydration.  Kidney function appears near his baseline, the rest of his lab studies otherwise are largely unremarkable without any significant anemia or electrolyte/metabolic derangement.  Patient was given fluid with improvement of his hemodynamic status.  Suspect hypotension due to hypovolemia from poor oral intake rather than sepsis.  Patient will be admitted under hospital observation to the hospital for ongoing gentle fluid hydration which could be contributing to his generalized weakness.    The patient was admitted.      DIAGNOSIS:     ICD-10-CM    1. Hypotension due to hypovolemia  E86.1       2. Generalized weakness  R53.1       3. Productive cough  R05.8         Scribe Disclosure:  I, Erica Leone, am serving as a scribe at 12:24 PM on 11/11/2024 to document services personally performed by Sergey Carlos MD based on my observations and the provider's statements to me.     11/11/2024  [facility] EMERGENCY DEPT     Sergey Carlos MD  11/11/24 2625

## 2024-11-11 NOTE — PHARMACY-ADMISSION MEDICATION HISTORY
Caller returning call to Clinical Team- Connected to CMA- Amanda- CONNECT CALL TO CALL CENTER CMA QUEUE- ROUTE MESSAGE TO CALL CENTER [facility] POOL ([phone]) .        Pharmacy Intern Admission Medication History    Admission medication history is complete. The information provided in this note is only as accurate as the sources available at the time of the update.    Information Source(s): Patient and CareEverywhere/SureScripts via in-person    Pertinent Information: Patient was discharged on 11/04/24 and states that his medication list has not changed since he was discharged. He has not taken any of his medications today.     Changes made to PTA medication list:  Added: None  Deleted: None  Changed: None    Allergies reviewed with patient and updates made in EHR: yes    Medication History Completed By: Lawrence Lozada 11/11/2024 5:11 PM    PTA Med List   Medication Sig Last Dose/Taking    acetaminophen (TYLENOL) 500 MG tablet Take 500 mg by mouth every 4 hours as needed for mild pain. Unknown    albuterol (PROVENTIL) (2.5 MG/3ML) 0.083% neb solution Take 2.5 mg by nebulization every 6 hours as needed for shortness of breath, wheezing or cough. Unknown    amLODIPine (NORVASC) 10 MG tablet Take 10 mg by mouth daily. 11/10/2024    apixaban ANTICOAGULANT (ELIQUIS) 5 MG tablet Take 5 mg by mouth 2 times daily. 11/10/2024 Evening    Apoaequorin (PREVAGEN PO) Take 1 capsule by mouth daily. 11/10/2024    atorvastatin (LIPITOR) 10 MG tablet Take 10 mg by mouth daily. 11/10/2024    benzocaine-menthol (CHLORASEPTIC) 6-10 MG lozenge Place 1 lozenge inside cheek every 3 hours as needed for sore throat. Unknown    cyanocobalamin (VITAMIN B-12) 1000 MCG tablet Take 1,000 mcg by mouth daily. 11/10/2024    cyclobenzaprine (FLEXERIL) 10 MG tablet Take 10 mg by mouth 3 times daily as needed for muscle spasms. Unknown    docusate sodium (COLACE) 100 MG capsule Take 100 mg by mouth daily. 11/10/2024    guaiFENesin (ROBITUSSIN) 20 mg/mL liquid Take 10 mLs (200 mg) by mouth every 4 hours as needed for other (secretion expectorant). Unknown    losartan (COZAAR) 100 MG tablet Take 100 mg by mouth daily.  11/10/2024    MELATONIN PO Take 1 tablet by mouth nightly as needed (sleep). Unknown    metoprolol tartrate (LOPRESSOR) 100 MG tablet Take 0.5 tablets (50 mg) by mouth 2 times daily. 11/10/2024 Evening    multivitamin w/minerals (MULTI-VITAMIN) tablet Take 1 tablet by mouth daily. 11/10/2024    omeprazole (PRILOSEC) 20 MG DR capsule Take 20 mg by mouth 2 times daily. 11/10/2024 Evening    oxyCODONE-acetaminophen (PERCOCET) 5-325 MG tablet Take 1 tablet by mouth 2 times daily as needed for severe pain. 1/2 tablet in morning as needed  1/4-1/2 tablet in evening as needed Unknown    sildenafil (VIAGRA) 100 MG tablet Take 1 tablet (100 mg) by mouth daily as needed. Unknown    sodium chloride (OCEAN) 0.65 % nasal spray Spray 1 spray into both nostrils every hour as needed for congestion. Unknown

## 2024-11-11 NOTE — ED NOTES
[facility]  ED Nurse Handoff Report    ED Chief complaint: Shortness of Breath  . ED Diagnosis:   Final diagnoses:   Hypotension due to hypovolemia   Generalized weakness   Productive cough       Allergies:   Allergies   Allergen Reactions    Aspirin Other (See Comments)     Other reaction(s): FEELING OF WARMTH  Feeling of warmth      Etodolac Other (See Comments)       Code Status: Full Code    Activity level - Baseline/Home:  independent.  Activity Level - Current:   standby.   Lift room needed: No.   Bariatric: No   Needed: No   Isolation: No.   Infection: Not Applicable.     Respiratory status: Room air    Vital Signs (within 30 minutes):   Vitals:    11/11/24 1313 11/11/24 1451 11/11/24 1505 11/11/24 1509   BP: 98/70 121/67  113/69   Pulse: 92   80   Resp:       Temp:       TempSrc:       SpO2:  99% 98%    Weight:       Height:           Cardiac Rhythm:  ,      Pain level:    Patient confused: No.   Patient Falls Risk: nonskid shoes/slippers when out of bed, arm band in place, patient and family education, and activity supervised.   Elimination Status: Has voided     Patient Report - Initial Complaint: shortness of breath.   Focused Assessment:  Crow Salinas is a 89 year old male w/PMH severe COPD, nicotine depencence, afib on DOAC, diastolic HF, HTN, DLD, CKD, pulmonary HTN, AAA, chronic back pain with narcotic dependence who presents to the ED for SOB and cough. He was admitted on 11/1/2024 for Human rhinovirus/enterovirus infection and acute hypoxic respiratory failure improved with steroid and supplement oxygen. He was discharge on 11/04 with prednisone and azithromycin, which he has completed a 5 day course. Reports symptoms initially improved on day one after discharge but since SOB and cough has worsen 2/2 to increase mucus production. He also endorses loss of appetite, fatigue, and poor oral intake. He does not use oxygen at home. He was seen in clinic this morning  and was recommended to head the ED for further assessment. Denies fevers, chills, chest pain, nausea or vomiting.     Abnormal Results:   Labs Ordered and Resulted from Time of ED Arrival to Time of ED Departure   COMPREHENSIVE METABOLIC PANEL - Abnormal       Result Value    Sodium 139      Potassium 4.1      Carbon Dioxide (CO2) 23      Anion Gap 13      Urea Nitrogen 25.9 (*)     Creatinine 1.57 (*)     GFR Estimate 42 (*)     Calcium 8.5 (*)     Chloride 103      Glucose 119 (*)     Alkaline Phosphatase 85      AST 22      ALT 17      Protein Total 6.2 (*)     Albumin 3.5      Bilirubin Total 0.8     NT PROBNP INPATIENT - Abnormal    N terminal Pro BNP Inpatient 2,205 (*)    TROPONIN T, HIGH SENSITIVITY - Abnormal    Troponin T, High Sensitivity 49 (*)    CBC WITH PLATELETS AND DIFFERENTIAL - Abnormal    WBC Count 8.2      RBC Count 4.48      Hemoglobin 14.0      Hematocrit 42.9      MCV 96      MCH 31.3      MCHC 32.6      RDW 15.9 (*)     Platelet Count 147 (*)     % Neutrophils 70      % Lymphocytes 14      % Monocytes 15      % Eosinophils 0      % Basophils 0      % Immature Granulocytes 1      NRBCs per 100 WBC 0      Absolute Neutrophils 5.7      Absolute Lymphocytes 1.1      Absolute Monocytes 1.3      Absolute Eosinophils 0.0      Absolute Basophils 0.0      Absolute Immature Granulocytes 0.0      Absolute NRBCs 0.0     INFLUENZA A/B, RSV, & SARS-COV2 PCR - Normal    Influenza A PCR Negative      Influenza B PCR Negative      RSV PCR Negative      SARS CoV2 PCR Negative     TROPONIN T, HIGH SENSITIVITY   RESPIRATORY PANEL PCR        XR Chest 2 Views   Final Result   IMPRESSION: Significant improved right lower lung opacity. No new   focal consolidation, significant pleural effusion or pneumothorax.   Similar background of interstitial change. Heart size is normal.      BUD PENA MD            SYSTEM ID:  [id number]          Treatments provided: IVF  Family Comments: daughter  OBS  brochure/video discussed/provided to patient:  Yes  ED Medications:   Medications   sodium chloride 0.9% BOLUS 1,000 mL (1,000 mLs Intravenous $New Bag 11/11/24 1315)       Drips infusing:  No  For the majority of the shift this patient was Green.   Interventions performed were none at this time.    Sepsis treatment initiated: No    Cares/treatment/interventions/medications to be completed following ED care: per hospitalist orders    ED Nurse Name: Harmony Vazquez RN  3:12 PM

## 2024-11-12 NOTE — DISCHARGE SUMMARY
[facility]  Hospitalist Discharge Summary      Date of Admission:  11/11/2024  Date of Discharge:  11/11/2024 10:50 PM  Discharging Provider: Anh Vivar PA-C  Discharge Service: Hospitalist Service    Discharge Diagnoses   Cough and SOB  Generalized weakness  Rhinovirus positive  MITZI    Clinically Significant Risk Factors          Follow-ups Needed After Discharge   Follow-up Appointments       Follow-up and recommended labs and tests       Follow up with primary care provider, Richard Zambrano, within 7 days for hospital follow- up.  The following labs/tests are recommended: BMP.  Push fluids at home. Recheck kidney function in 1-2 weeks.   Complete 5 days of Prednisone, follow up with PCP to consider a steroid taper.                Unresulted Labs Ordered in the Past 30 Days of this Admission       No orders found from 10/12/2024 to 11/12/2024.        These results will be followed up by PCP    Discharge Disposition   Discharged to home, pt requested discharge due to bed availability  Condition at discharge: Stable    Hospital Course      Crow Salinas is a 89 year old male with PMHx significant for COPD, diastolic CHF, afib on DOAC, HTN, HLP, CKD, pulmonary HTN, AAA, chronic back pain and ongoing nicotine dependence, who was admitted on 11/11/2024 with ongoing cough, SOB and generalized weakness.     1. Ongoing cough and SOB  Recent COPD exacerbation due to rhinovirus infection with possible CAP  Pt discharged from the hospital on 11/4 with 2 more days of Azithromycin and 2 more days of Prednisone. Sx had improved and he weaned off O2 prior to discharge. Since completing Prednisone, sx have worsened. Notes ongoing productive cough and SOB with exertion. Denies fever or chest pain. Notes occasional wheezing as well.   Notes poor sleep since discharge due to his wife having dementia, poor appetite with minimal intake. Notes generalized weakness, does not feel safe to discharge  home.  Mild hypotension initially, BP 84/53, VS otherwise stable. No hypoxia. CMP significant for slightly elevated Cr and BUN, otherwise unremarkable. CBC unremarkable. BNP elevated at 2200 and troponin 49-->43 on recheck. Covid, RSV and influenza negative. Respiratory panel pending. CXR shows improvement in previous R lower lung opacity, no new focal consolidation.  He was given 1L NS bolus in ED.   Suspect ongoing sx from COPD exacerbation related to rhinovirus infection.  - admit to OBS  - start Prednisone 40 mg x 5 days with taper  - continue PRN albuterol nebs  - continue Robitussin DM  - PT/SW consults  - give gentle IVFs over 10 hrs  - PRN O2  - low suspicion for CHF exacerbation  - add on procalcitonin, will hold off on further abx for now    **pt requested discharge home as he was not able to get a room tonight in the hospital due to bed availability. He is not febrile or hypoxic, no evidence of new PNA on CXR, no indication for abx. Rhinovirus still positive but procalcitonin low. Discharge is reasonable if family is able to support him at home. I would recommend 5 days of Prednisone on discharge and close outpatient follow up with PCP to consider taper. Recheck BMP on follow up, push po fluids.    2. MITZI on CKD stage IIIb  Cr 1.57, baseline is likely closer to 1.2-1.3. Likely due to poor po intake since discharge home  - gentle IVFs overnight  - recheck labs in AM    3. Diastolic CHF  Afib on DOAC  HTN  HLP  Pulmonary HTN  Euvolemic if not mildly hypovolemic given mild MITZI.  - resume home Eliquis  - resume home metoprolol  - resume home amlodipine and losartan with parameters  - resume atorvastatin on discharge due to OBS status  - hold sildenafil     4. Chronic back pain   Uses occasional oxycodone, resume  5. Ongoing nicotine dependence  Pt endorses sporadic nicotine use    Consultations This Hospital Stay   PHYSICAL THERAPY ADULT IP CONSULT  CARE MANAGEMENT / SOCIAL WORK IP CONSULT    Code Status   No  CPR- Do NOT Intubate    Time Spent on this Encounter   I, Anh Vivar PA-C, personally saw the patient today and spent greater than 30 minutes discharging this patient.       Anh Vivar PA-C  [facility] EMERGENCY DEPT  [address]  [facility] [address]  Phone: [phone]  Fax: [phone]  ______________________________________________________________________    Physical Exam   Vital Signs: Temp: 97.8  F (36.6  C) Temp src: Oral BP: 92/72 Pulse: 75   Resp: 17 SpO2: 98 % O2 Device: None (Room air)    Weight: 145 lbs 0 oz         Primary Care Physician   Richard Peyman Zambrano    Discharge Orders      Reason for your hospital stay    Cough, SOB and weakness due to recent rhinovirus infection. You are requesting discharge due to bed availability. You are not febrile or hypoxic and there is no indication at this time for further antibiotics so a discharge is reasonable. Family was called and ok with discharge home. You should continue Prednisone for 5 days and follow up with PCP to consider a taper.     Follow-up and recommended labs and tests     Follow up with primary care provider, Richard Peyman Zambrano, within 7 days for hospital follow- up.  The following labs/tests are recommended: BMP.  Push fluids at home. Recheck kidney function in 1-2 weeks.   Complete 5 days of Prednisone, follow up with PCP to consider a steroid taper.     Activity    Your activity upon discharge: activity as tolerated     When to contact your care team    Call your primary doctor if you have any of the following: temperature greater than 101, increased shortness of breath, or increased weakness.     Diet    Follow this diet upon discharge: Regular       Significant Results and Procedures   Most Recent 3 CBC's:  Recent Labs   Lab Test 11/11/24  1315 11/03/24  0622 11/02/24  0644   WBC 8.2 7.4 6.6   HGB 14.0 12.1* 13.9   MCV 96 95 96   * 129* 126*     Most Recent 3 BMP's:  Recent Labs   Lab Test  11/11/24  1315 11/04/24  0609 11/03/24  0623    137 139   POTASSIUM 4.1 4.0 4.2   CHLORIDE 103 101 102   CO2 23 24 24   BUN 25.9* 29.3* 28.9*   CR 1.57* 1.17 1.35*   ANIONGAP 13 12 13   MIRNA 8.5* 8.2* 8.5*   * 112* 198*     Most Recent 2 LFT's:  Recent Labs   Lab Test 11/11/24  1315 11/01/24 2046   AST 22 27   ALT 17 17   ALKPHOS 85 106   BILITOTAL 0.8 0.9     Most Recent 3 Troponin's:  Recent Labs   Lab Test 07/12/17  1420   TROPI <0.015  The 99th percentile for upper reference range is 0.045 ug/L.  Troponin values in   the range of 0.045 - 0.120 ug/L may be associated with risks of adverse   clinical events.       Most Recent 3 BNP's:  Recent Labs   Lab Test 11/11/24  1315 11/01/24 2046 06/16/22  1152   NTBNPI 2,205* 4,609* 2,233*     7-Day Micro Results       Collected Updated Procedure Result Status      11/11/2024 1315 11/11/2024 2147 Respiratory Panel PCR [[id number]]    (Abnormal)   Swab from Nasopharyngeal    Final result Component Value   Adenovirus Not Detected   Coronavirus Not Detected   This test detects Coronavirus 229E, HKU1, NL63 and OC43 but does not distinguish between them. It does not detect MERS ( Respiratory Syndrome), SARS (Severe Acute Respiratory Syndrome) or 2019-nCoV (Novel 2019) Coronavirus.   Human Metapneumovirus Not Detected   Human Rhin/Enterovirus Detected   Influenza A Not Detected   Influenza A, H1 Not Detected   Influenza A 2009 H1N1 Not Detected   Influenza A, H3 Not Detected   Influenza B Not Detected   Parainfluenza Virus 1 Not Detected   Parainfluenza Virus 2 Not Detected   Parainfluenza Virus 3 Not Detected   Parainfluenza Virus 4 Not Detected   Respiratory Syncytial Virus A Not Detected   Respiratory Syncytial Virus B Not Detected   Chlamydia Pneumoniae Not Detected   Mycoplasma Pneumoniae Not Detected            11/11/2024 1315 11/11/2024 1416 Symptomatic Influenza A/B, RSV, & SARS-CoV2 PCR (COVID-19) Nasopharyngeal [[id number]]    Swab from  Nasopharyngeal    Final result Component Value   Influenza A PCR Negative   Influenza B PCR Negative   RSV PCR Negative   SARS CoV2 PCR Negative   NEGATIVE: SARS-CoV-2 (COVID-19) RNA not detected, presumed negative.                  Most Recent Urinalysis:No lab results found.,   Results for orders placed or performed during the hospital encounter of 11/11/24   XR Chest 2 Views    Narrative    CHEST TWO VIEWS 11/11/2024 1:29 PM     HISTORY: SOB    COMPARISON: Chest radiograph 11/1/2024      Impression    IMPRESSION: Significant improved right lower lung opacity. No new  focal consolidation, significant pleural effusion or pneumothorax.  Similar background of interstitial change. Heart size is normal.    BUD PENA MD         SYSTEM ID:  [id number]       Discharge Medications   Current Discharge Medication List        START taking these medications    Details   predniSONE (DELTASONE) 20 MG tablet Take 2 tablets (40 mg) by mouth daily for 5 days.  Qty: 10 tablet, Refills: 0    Associated Diagnoses: Productive cough; COPD exacerbation (H)           CONTINUE these medications which have NOT CHANGED    Details   acetaminophen (TYLENOL) 500 MG tablet Take 500 mg by mouth every 4 hours as needed for mild pain.      albuterol (PROVENTIL) (2.5 MG/3ML) 0.083% neb solution Take 2.5 mg by nebulization every 6 hours as needed for shortness of breath, wheezing or cough.      amLODIPine (NORVASC) 10 MG tablet Take 10 mg by mouth daily.      apixaban ANTICOAGULANT (ELIQUIS) 5 MG tablet Take 5 mg by mouth 2 times daily.      Apoaequorin (PREVAGEN PO) Take 1 capsule by mouth daily.      atorvastatin (LIPITOR) 10 MG tablet Take 10 mg by mouth daily.      benzocaine-menthol (CHLORASEPTIC) 6-10 MG lozenge Place 1 lozenge inside cheek every 3 hours as needed for sore throat.  Qty: 30 lozenge, Refills: 0    Associated Diagnoses: COPD exacerbation (H)      cyanocobalamin (VITAMIN B-12) 1000 MCG tablet Take 1,000 mcg by mouth  daily.      cyclobenzaprine (FLEXERIL) 10 MG tablet Take 10 mg by mouth 3 times daily as needed for muscle spasms.      docusate sodium (COLACE) 100 MG capsule Take 100 mg by mouth daily.      guaiFENesin (ROBITUSSIN) 20 mg/mL liquid Take 10 mLs (200 mg) by mouth every 4 hours as needed for other (secretion expectorant).  Qty: 118 mL, Refills: 0    Associated Diagnoses: COPD exacerbation (H)      losartan (COZAAR) 100 MG tablet Take 100 mg by mouth daily.      MELATONIN PO Take 1 tablet by mouth nightly as needed (sleep).      metoprolol tartrate (LOPRESSOR) 100 MG tablet Take 0.5 tablets (50 mg) by mouth 2 times daily.    Comments: DEFER TO PRIMARY PRESCRIBER TO REASSES  Associated Diagnoses: Essential hypertension      multivitamin w/minerals (MULTI-VITAMIN) tablet Take 1 tablet by mouth daily.      omeprazole (PRILOSEC) 20 MG DR capsule Take 20 mg by mouth 2 times daily.      oxyCODONE-acetaminophen (PERCOCET) 5-325 MG tablet Take 1 tablet by mouth 2 times daily as needed for severe pain. 1/2 tablet in morning as needed  1/4-1/2 tablet in evening as needed    Comments: DEFER TO PRIMARY PRESCRIBER  Associated Diagnoses: Status post total replacement of right hip      sildenafil (VIAGRA) 100 MG tablet Take 1 tablet (100 mg) by mouth daily as needed.    Comments: DEFER TO PRIMARY PRESCRIBER  Associated Diagnoses: Other male erectile dysfunction      sodium chloride (OCEAN) 0.65 % nasal spray Spray 1 spray into both nostrils every hour as needed for congestion.  Qty: 30 mL, Refills: 0    Associated Diagnoses: COPD exacerbation (H)           Allergies   Allergies   Allergen Reactions    Aspirin Other (See Comments)     Other reaction(s): FEELING OF WARMTH  Feeling of warmth      Etodolac Other (See Comments)

## 2024-11-12 NOTE — PROGRESS NOTES
Patient discharged with all belongings, VSS on RA. Ambulatory and home with son. Discharge education given.

## 2024-11-18 ENCOUNTER — LAB REQUISITION (OUTPATIENT)
Dept: LAB | Facility: CLINIC | Age: 89
End: 2024-11-18
Payer: COMMERCIAL

## 2024-11-18 DIAGNOSIS — J44.9 CHRONIC OBSTRUCTIVE PULMONARY DISEASE, UNSPECIFIED (H): ICD-10-CM

## 2024-11-18 LAB
ANION GAP SERPL CALCULATED.3IONS-SCNC: 12 MMOL/L (ref 7–15)
BASOPHILS # BLD AUTO: 0 10E3/UL (ref 0–0.2)
BASOPHILS NFR BLD AUTO: 0 %
BUN SERPL-MCNC: 24.1 MG/DL (ref 8–23)
CALCIUM SERPL-MCNC: 7.9 MG/DL (ref 8.8–10.4)
CHLORIDE SERPL-SCNC: 104 MMOL/L (ref 98–107)
CREAT SERPL-MCNC: 1.23 MG/DL (ref 0.67–1.17)
EGFRCR SERPLBLD CKD-EPI 2021: 56 ML/MIN/1.73M2
EOSINOPHIL # BLD AUTO: 0 10E3/UL (ref 0–0.7)
EOSINOPHIL NFR BLD AUTO: 0 %
ERYTHROCYTE [DISTWIDTH] IN BLOOD BY AUTOMATED COUNT: 16.5 % (ref 10–15)
GLUCOSE SERPL-MCNC: 100 MG/DL (ref 70–99)
HCO3 SERPL-SCNC: 24 MMOL/L (ref 22–29)
HCT VFR BLD AUTO: 39.9 % (ref 40–53)
HGB BLD-MCNC: 13 G/DL (ref 13.3–17.7)
IMM GRANULOCYTES # BLD: 0 10E3/UL
IMM GRANULOCYTES NFR BLD: 0 %
LYMPHOCYTES # BLD AUTO: 1.1 10E3/UL (ref 0.8–5.3)
LYMPHOCYTES NFR BLD AUTO: 12 %
MCH RBC QN AUTO: 31.3 PG (ref 26.5–33)
MCHC RBC AUTO-ENTMCNC: 32.6 G/DL (ref 31.5–36.5)
MCV RBC AUTO: 96 FL (ref 78–100)
MONOCYTES # BLD AUTO: 0.8 10E3/UL (ref 0–1.3)
MONOCYTES NFR BLD AUTO: 8 %
NEUTROPHILS # BLD AUTO: 7.4 10E3/UL (ref 1.6–8.3)
NEUTROPHILS NFR BLD AUTO: 79 %
NRBC # BLD AUTO: 0 10E3/UL
NRBC BLD AUTO-RTO: 0 /100
PLATELET # BLD AUTO: 146 10E3/UL (ref 150–450)
POTASSIUM SERPL-SCNC: 3.7 MMOL/L (ref 3.4–5.3)
RBC # BLD AUTO: 4.15 10E6/UL (ref 4.4–5.9)
SODIUM SERPL-SCNC: 140 MMOL/L (ref 135–145)
WBC # BLD AUTO: 9.4 10E3/UL (ref 4–11)

## 2024-11-18 PROCEDURE — 85025 COMPLETE CBC W/AUTO DIFF WBC: CPT | Mod: ORL | Performed by: INTERNAL MEDICINE

## 2024-11-18 PROCEDURE — 80048 BASIC METABOLIC PNL TOTAL CA: CPT | Mod: ORL | Performed by: INTERNAL MEDICINE

## 2025-01-23 ENCOUNTER — LAB REQUISITION (OUTPATIENT)
Dept: LAB | Facility: CLINIC | Age: OVER 89
End: 2025-01-23
Payer: COMMERCIAL

## 2025-01-23 DIAGNOSIS — R30.0 DYSURIA: ICD-10-CM

## 2025-01-23 LAB
ALBUMIN UR-MCNC: 30 MG/DL
APPEARANCE UR: ABNORMAL
BACTERIA #/AREA URNS HPF: ABNORMAL /HPF
BILIRUB UR QL STRIP: NEGATIVE
COLOR UR AUTO: ABNORMAL
GLUCOSE UR STRIP-MCNC: NEGATIVE MG/DL
HGB UR QL STRIP: ABNORMAL
KETONES UR STRIP-MCNC: NEGATIVE MG/DL
LEUKOCYTE ESTERASE UR QL STRIP: ABNORMAL
MUCOUS THREADS #/AREA URNS LPF: PRESENT /LPF
NITRATE UR QL: POSITIVE
PH UR STRIP: 5.5 [PH] (ref 5–7)
RBC URINE: 25 /HPF
SP GR UR STRIP: 1.01 (ref 1–1.03)
SQUAMOUS EPITHELIAL: <1 /HPF
UROBILINOGEN UR STRIP-MCNC: NORMAL MG/DL
WBC URINE: >182 /HPF

## 2025-01-23 PROCEDURE — 87086 URINE CULTURE/COLONY COUNT: CPT | Mod: ORL

## 2025-01-23 PROCEDURE — 81001 URINALYSIS AUTO W/SCOPE: CPT | Mod: ORL

## 2025-01-23 PROCEDURE — 87186 SC STD MICRODIL/AGAR DIL: CPT | Mod: ORL

## 2025-01-25 LAB — BACTERIA UR CULT: ABNORMAL

## 2025-06-07 ENCOUNTER — LAB REQUISITION (OUTPATIENT)
Dept: LAB | Facility: CLINIC | Age: OVER 89
End: 2025-06-07
Payer: COMMERCIAL

## 2025-06-07 DIAGNOSIS — R35.0 FREQUENCY OF MICTURITION: ICD-10-CM

## 2025-06-07 DIAGNOSIS — R79.81 ABNORMAL BLOOD-GAS LEVEL: ICD-10-CM

## 2025-06-07 LAB
ALBUMIN SERPL BCG-MCNC: 3.6 G/DL (ref 3.5–5.2)
ALBUMIN UR-MCNC: 30 MG/DL
ALP SERPL-CCNC: 117 U/L (ref 40–150)
ALT SERPL W P-5'-P-CCNC: 24 U/L (ref 0–70)
ANION GAP SERPL CALCULATED.3IONS-SCNC: 9 MMOL/L (ref 7–15)
APPEARANCE UR: ABNORMAL
AST SERPL W P-5'-P-CCNC: 30 U/L (ref 0–45)
BACTERIA #/AREA URNS HPF: ABNORMAL /HPF
BASOPHILS # BLD AUTO: 0 10E3/UL (ref 0–0.2)
BASOPHILS NFR BLD AUTO: 1 %
BILIRUB SERPL-MCNC: 0.6 MG/DL
BILIRUB UR QL STRIP: NEGATIVE
BUN SERPL-MCNC: 21.2 MG/DL (ref 8–23)
CALCIUM SERPL-MCNC: 8.6 MG/DL (ref 8.8–10.4)
CHLORIDE SERPL-SCNC: 104 MMOL/L (ref 98–107)
COLOR UR AUTO: YELLOW
CREAT SERPL-MCNC: 1.34 MG/DL (ref 0.67–1.17)
EGFRCR SERPLBLD CKD-EPI 2021: 51 ML/MIN/1.73M2
EOSINOPHIL # BLD AUTO: 0.1 10E3/UL (ref 0–0.7)
EOSINOPHIL NFR BLD AUTO: 1 %
ERYTHROCYTE [DISTWIDTH] IN BLOOD BY AUTOMATED COUNT: 17.4 % (ref 10–15)
GLUCOSE SERPL-MCNC: 95 MG/DL (ref 70–99)
GLUCOSE UR STRIP-MCNC: NEGATIVE MG/DL
HCO3 SERPL-SCNC: 29 MMOL/L (ref 22–29)
HCT VFR BLD AUTO: 39 % (ref 40–53)
HGB BLD-MCNC: 12.8 G/DL (ref 13.3–17.7)
HGB UR QL STRIP: NEGATIVE
IMM GRANULOCYTES # BLD: 0 10E3/UL
IMM GRANULOCYTES NFR BLD: 1 %
KETONES UR STRIP-MCNC: NEGATIVE MG/DL
LEUKOCYTE ESTERASE UR QL STRIP: ABNORMAL
LYMPHOCYTES # BLD AUTO: 1.2 10E3/UL (ref 0.8–5.3)
LYMPHOCYTES NFR BLD AUTO: 19 %
MCH RBC QN AUTO: 33.1 PG (ref 26.5–33)
MCHC RBC AUTO-ENTMCNC: 32.8 G/DL (ref 31.5–36.5)
MCV RBC AUTO: 101 FL (ref 78–100)
MONOCYTES # BLD AUTO: 0.7 10E3/UL (ref 0–1.3)
MONOCYTES NFR BLD AUTO: 12 %
MUCOUS THREADS #/AREA URNS LPF: PRESENT /LPF
NEUTROPHILS # BLD AUTO: 4.2 10E3/UL (ref 1.6–8.3)
NEUTROPHILS NFR BLD AUTO: 68 %
NITRATE UR QL: POSITIVE
NRBC # BLD AUTO: 0 10E3/UL
NRBC BLD AUTO-RTO: 0 /100
NT-PROBNP SERPL-MCNC: 6127 PG/ML (ref 0–852)
PH UR STRIP: 6 [PH] (ref 5–7)
PLATELET # BLD AUTO: 129 10E3/UL (ref 150–450)
POTASSIUM SERPL-SCNC: 4.2 MMOL/L (ref 3.4–5.3)
PROT SERPL-MCNC: 6.2 G/DL (ref 6.4–8.3)
RBC # BLD AUTO: 3.87 10E6/UL (ref 4.4–5.9)
RBC URINE: 5 /HPF
SODIUM SERPL-SCNC: 142 MMOL/L (ref 135–145)
SP GR UR STRIP: 1.02 (ref 1–1.03)
UROBILINOGEN UR STRIP-MCNC: NORMAL MG/DL
WBC # BLD AUTO: 6.2 10E3/UL (ref 4–11)
WBC URINE: 51 /HPF

## 2025-06-07 PROCEDURE — 80053 COMPREHEN METABOLIC PANEL: CPT | Mod: ORL

## 2025-06-07 PROCEDURE — 87186 SC STD MICRODIL/AGAR DIL: CPT | Mod: ORL

## 2025-06-07 PROCEDURE — 85025 COMPLETE CBC W/AUTO DIFF WBC: CPT | Mod: ORL

## 2025-06-07 PROCEDURE — 81001 URINALYSIS AUTO W/SCOPE: CPT | Mod: ORL

## 2025-06-07 PROCEDURE — 83880 ASSAY OF NATRIURETIC PEPTIDE: CPT | Mod: ORL

## 2025-06-09 LAB — BACTERIA UR CULT: ABNORMAL

## 2025-07-26 ENCOUNTER — LAB REQUISITION (OUTPATIENT)
Dept: LAB | Facility: CLINIC | Age: OVER 89
End: 2025-07-26
Payer: COMMERCIAL

## 2025-07-26 DIAGNOSIS — R19.7 DIARRHEA, UNSPECIFIED: ICD-10-CM

## 2025-07-26 DIAGNOSIS — R63.4 ABNORMAL WEIGHT LOSS: ICD-10-CM

## 2025-07-26 DIAGNOSIS — E11.22 TYPE 2 DIABETES MELLITUS WITH DIABETIC CHRONIC KIDNEY DISEASE (H): ICD-10-CM

## 2025-07-26 LAB
ALBUMIN SERPL BCG-MCNC: 3.3 G/DL (ref 3.5–5.2)
ALP SERPL-CCNC: 99 U/L (ref 40–150)
ALT SERPL W P-5'-P-CCNC: 39 U/L (ref 0–70)
ANION GAP SERPL CALCULATED.3IONS-SCNC: 9 MMOL/L (ref 7–15)
AST SERPL W P-5'-P-CCNC: 45 U/L (ref 0–45)
BILIRUB SERPL-MCNC: 0.5 MG/DL
BUN SERPL-MCNC: 25 MG/DL (ref 8–23)
CALCIUM SERPL-MCNC: 8.4 MG/DL (ref 8.8–10.4)
CHLORIDE SERPL-SCNC: 102 MMOL/L (ref 98–107)
CREAT SERPL-MCNC: 1.32 MG/DL (ref 0.67–1.17)
EGFRCR SERPLBLD CKD-EPI 2021: 52 ML/MIN/1.73M2
ERYTHROCYTE [DISTWIDTH] IN BLOOD BY AUTOMATED COUNT: 16.6 % (ref 10–15)
GLUCOSE SERPL-MCNC: 79 MG/DL (ref 70–99)
HCO3 SERPL-SCNC: 31 MMOL/L (ref 22–29)
HCT VFR BLD AUTO: 35.6 % (ref 40–53)
HGB BLD-MCNC: 11.7 G/DL (ref 13.3–17.7)
HOLD SPECIMEN: NORMAL
MCH RBC QN AUTO: 33 PG (ref 26.5–33)
MCHC RBC AUTO-ENTMCNC: 32.9 G/DL (ref 31.5–36.5)
MCV RBC AUTO: 100 FL (ref 78–100)
PLATELET # BLD AUTO: 101 10E3/UL (ref 150–450)
POTASSIUM SERPL-SCNC: 4.1 MMOL/L (ref 3.4–5.3)
PROT SERPL-MCNC: 5.9 G/DL (ref 6.4–8.3)
RBC # BLD AUTO: 3.55 10E6/UL (ref 4.4–5.9)
SODIUM SERPL-SCNC: 142 MMOL/L (ref 135–145)
TSH SERPL DL<=0.005 MIU/L-ACNC: 1.08 UIU/ML (ref 0.3–4.2)
WBC # BLD AUTO: 4.9 10E3/UL (ref 4–11)

## 2025-07-26 PROCEDURE — 85027 COMPLETE CBC AUTOMATED: CPT | Mod: ORL | Performed by: NURSE PRACTITIONER

## 2025-07-26 PROCEDURE — 80053 COMPREHEN METABOLIC PANEL: CPT | Mod: ORL | Performed by: NURSE PRACTITIONER

## 2025-07-26 PROCEDURE — 84443 ASSAY THYROID STIM HORMONE: CPT | Mod: ORL | Performed by: NURSE PRACTITIONER

## 2025-08-30 ENCOUNTER — HOSPITAL ENCOUNTER (INPATIENT)
Facility: CLINIC | Age: OVER 89
End: 2025-08-30
Admitting: INTERNAL MEDICINE
Payer: COMMERCIAL

## 2025-08-30 DIAGNOSIS — J44.1 COPD EXACERBATION (H): ICD-10-CM

## 2025-08-30 DIAGNOSIS — J96.01 ACUTE RESPIRATORY FAILURE WITH HYPOXIA (H): ICD-10-CM

## 2025-08-30 DIAGNOSIS — R79.89 ELEVATED TROPONIN: ICD-10-CM

## 2025-08-30 DIAGNOSIS — J18.9 COMMUNITY ACQUIRED PNEUMONIA OF BOTH LUNGS: Primary | ICD-10-CM

## 2025-08-30 DIAGNOSIS — J90 PLEURAL EFFUSION: ICD-10-CM

## 2025-08-30 LAB
ALBUMIN SERPL BCG-MCNC: 3 G/DL (ref 3.5–5.2)
ALBUMIN UR-MCNC: 10 MG/DL
ALP SERPL-CCNC: 102 U/L (ref 40–150)
ALT SERPL W P-5'-P-CCNC: 17 U/L (ref 0–70)
ANION GAP SERPL CALCULATED.3IONS-SCNC: 9 MMOL/L (ref 7–15)
APPEARANCE UR: CLEAR
AST SERPL W P-5'-P-CCNC: 28 U/L (ref 0–45)
BASE EXCESS BLDV CALC-SCNC: 2 MMOL/L (ref -3–3)
BASOPHILS # BLD AUTO: 0.03 10E3/UL (ref 0–0.2)
BASOPHILS NFR BLD AUTO: 0.5 %
BILIRUB SERPL-MCNC: 0.5 MG/DL
BILIRUB UR QL STRIP: NEGATIVE
BUN SERPL-MCNC: 23.1 MG/DL (ref 8–23)
CALCIUM SERPL-MCNC: 8.5 MG/DL (ref 8.8–10.4)
CHLORIDE SERPL-SCNC: 107 MMOL/L (ref 98–107)
COLOR UR AUTO: ABNORMAL
CREAT SERPL-MCNC: 1.16 MG/DL (ref 0.67–1.17)
EGFRCR SERPLBLD CKD-EPI 2021: 60 ML/MIN/1.73M2
EOSINOPHIL # BLD AUTO: 0.07 10E3/UL (ref 0–0.7)
EOSINOPHIL NFR BLD AUTO: 1.2 %
ERYTHROCYTE [DISTWIDTH] IN BLOOD BY AUTOMATED COUNT: 17.2 % (ref 10–15)
EST. AVERAGE GLUCOSE BLD GHB EST-MCNC: 143 MG/DL
GLUCOSE SERPL-MCNC: 90 MG/DL (ref 70–99)
GLUCOSE UR STRIP-MCNC: NEGATIVE MG/DL
HBA1C MFR BLD: 6.6 %
HCO3 BLDV-SCNC: 28 MMOL/L (ref 21–28)
HCO3 SERPL-SCNC: 25 MMOL/L (ref 22–29)
HCT VFR BLD AUTO: 35.5 % (ref 40–53)
HGB BLD-MCNC: 11.5 G/DL (ref 13.3–17.7)
HGB UR QL STRIP: NEGATIVE
IMM GRANULOCYTES # BLD: <0.03 10E3/UL
IMM GRANULOCYTES NFR BLD: 0.3 %
KETONES UR STRIP-MCNC: NEGATIVE MG/DL
L PNEUMO1 AG UR QL IA: NEGATIVE
LACTATE SERPL-SCNC: 1.2 MMOL/L (ref 0.7–2)
LACTATE SERPL-SCNC: 2.1 MMOL/L (ref 0.7–2)
LEUKOCYTE ESTERASE UR QL STRIP: NEGATIVE
LYMPHOCYTES # BLD AUTO: 1.06 10E3/UL (ref 0.8–5.3)
LYMPHOCYTES NFR BLD AUTO: 17.7 %
MAGNESIUM SERPL-MCNC: 1.8 MG/DL (ref 1.7–2.3)
MCH RBC QN AUTO: 32.7 PG (ref 26.5–33)
MCHC RBC AUTO-ENTMCNC: 32.4 G/DL (ref 31.5–36.5)
MCV RBC AUTO: 100.9 FL (ref 78–100)
MONOCYTES # BLD AUTO: 0.82 10E3/UL (ref 0–1.3)
MONOCYTES NFR BLD AUTO: 13.7 %
MUCOUS THREADS #/AREA URNS LPF: PRESENT /LPF
NEUTROPHILS # BLD AUTO: 3.99 10E3/UL (ref 1.6–8.3)
NEUTROPHILS NFR BLD AUTO: 66.6 %
NITRATE UR QL: NEGATIVE
NRBC # BLD AUTO: <0.03 10E3/UL
NRBC BLD AUTO-RTO: 0 /100
NT-PROBNP SERPL-MCNC: 3984 PG/ML (ref 0–852)
O2/TOTAL GAS SETTING VFR VENT: 21 %
OXYHGB MFR BLDV: 34 % (ref 70–75)
PCO2 BLDV: 51 MM HG (ref 40–50)
PH BLDV: 7.35 [PH] (ref 7.32–7.43)
PH UR STRIP: 5.5 [PH] (ref 5–7)
PLATELET # BLD AUTO: 149 10E3/UL (ref 150–450)
PO2 BLDV: 24 MM HG (ref 25–47)
POTASSIUM SERPL-SCNC: 4.9 MMOL/L (ref 3.4–5.3)
PROCALCITONIN SERPL IA-MCNC: 0.04 NG/ML
PROT SERPL-MCNC: 6 G/DL (ref 6.4–8.3)
RBC # BLD AUTO: 3.52 10E6/UL (ref 4.4–5.9)
RBC URINE: 1 /HPF
S PNEUM AG SPEC QL: NEGATIVE
SAO2 % BLDV: 35.2 % (ref 70–75)
SODIUM SERPL-SCNC: 141 MMOL/L (ref 135–145)
SP GR UR STRIP: 1.01 (ref 1–1.03)
SPECIMEN TYPE: NORMAL
SQUAMOUS EPITHELIAL: 1 /HPF
TROPONIN T SERPL HS-MCNC: 45 NG/L
TROPONIN T SERPL HS-MCNC: 46 NG/L
TSH SERPL DL<=0.005 MIU/L-ACNC: 1.06 UIU/ML (ref 0.3–4.2)
UROBILINOGEN UR STRIP-MCNC: NORMAL MG/DL
WBC # BLD AUTO: 5.99 10E3/UL (ref 4–11)
WBC URINE: 2 /HPF

## 2025-08-30 PROCEDURE — 82805 BLOOD GASES W/O2 SATURATION: CPT

## 2025-08-30 PROCEDURE — 255N000002 HC RX 255 OP 636

## 2025-08-30 PROCEDURE — 81003 URINALYSIS AUTO W/O SCOPE: CPT | Performed by: INTERNAL MEDICINE

## 2025-08-30 PROCEDURE — 250N000011 HC RX IP 250 OP 636

## 2025-08-30 PROCEDURE — 120N000001 HC R&B MED SURG/OB

## 2025-08-30 PROCEDURE — 83036 HEMOGLOBIN GLYCOSYLATED A1C: CPT | Performed by: INTERNAL MEDICINE

## 2025-08-30 PROCEDURE — 83605 ASSAY OF LACTIC ACID: CPT

## 2025-08-30 PROCEDURE — 36415 COLL VENOUS BLD VENIPUNCTURE: CPT

## 2025-08-30 PROCEDURE — 250N000013 HC RX MED GY IP 250 OP 250 PS 637: Performed by: INTERNAL MEDICINE

## 2025-08-30 PROCEDURE — 99285 EMERGENCY DEPT VISIT HI MDM: CPT | Mod: 25

## 2025-08-30 PROCEDURE — 94640 AIRWAY INHALATION TREATMENT: CPT

## 2025-08-30 PROCEDURE — 84145 PROCALCITONIN (PCT): CPT | Performed by: INTERNAL MEDICINE

## 2025-08-30 PROCEDURE — 87899 AGENT NOS ASSAY W/OPTIC: CPT | Performed by: INTERNAL MEDICINE

## 2025-08-30 PROCEDURE — 250N000009 HC RX 250

## 2025-08-30 PROCEDURE — 87040 BLOOD CULTURE FOR BACTERIA: CPT

## 2025-08-30 PROCEDURE — 258N000003 HC RX IP 258 OP 636

## 2025-08-30 PROCEDURE — 84443 ASSAY THYROID STIM HORMONE: CPT | Performed by: INTERNAL MEDICINE

## 2025-08-30 PROCEDURE — 83880 ASSAY OF NATRIURETIC PEPTIDE: CPT

## 2025-08-30 PROCEDURE — 85025 COMPLETE CBC W/AUTO DIFF WBC: CPT

## 2025-08-30 PROCEDURE — 93005 ELECTROCARDIOGRAM TRACING: CPT

## 2025-08-30 PROCEDURE — 250N000011 HC RX IP 250 OP 636: Performed by: INTERNAL MEDICINE

## 2025-08-30 PROCEDURE — 80053 COMPREHEN METABOLIC PANEL: CPT

## 2025-08-30 PROCEDURE — 83735 ASSAY OF MAGNESIUM: CPT

## 2025-08-30 PROCEDURE — 99223 1ST HOSP IP/OBS HIGH 75: CPT | Performed by: INTERNAL MEDICINE

## 2025-08-30 PROCEDURE — 84484 ASSAY OF TROPONIN QUANT: CPT

## 2025-08-30 RX ORDER — CEFTRIAXONE 2 G/1
2 INJECTION, POWDER, FOR SOLUTION INTRAMUSCULAR; INTRAVENOUS ONCE
Status: COMPLETED | OUTPATIENT
Start: 2025-08-30 | End: 2025-08-30

## 2025-08-30 RX ORDER — ACETAMINOPHEN 325 MG/1
650 TABLET ORAL EVERY 4 HOURS PRN
Status: DISPENSED | OUTPATIENT
Start: 2025-08-30

## 2025-08-30 RX ORDER — ACETAMINOPHEN 500 MG
500 TABLET ORAL EVERY 4 HOURS PRN
Status: DISCONTINUED | OUTPATIENT
Start: 2025-08-30 | End: 2025-08-30

## 2025-08-30 RX ORDER — ATORVASTATIN CALCIUM 10 MG/1
10 TABLET, FILM COATED ORAL EVERY EVENING
Status: DISPENSED | OUTPATIENT
Start: 2025-08-30

## 2025-08-30 RX ORDER — METHYLPREDNISOLONE SODIUM SUCCINATE 125 MG/2ML
125 INJECTION INTRAMUSCULAR; INTRAVENOUS ONCE
Status: COMPLETED | OUTPATIENT
Start: 2025-08-30 | End: 2025-08-30

## 2025-08-30 RX ORDER — LANOLIN ALCOHOL/MO/W.PET/CERES
1000 CREAM (GRAM) TOPICAL DAILY
Status: DISPENSED | OUTPATIENT
Start: 2025-08-31

## 2025-08-30 RX ORDER — AZITHROMYCIN 500 MG/5ML
500 INJECTION, POWDER, LYOPHILIZED, FOR SOLUTION INTRAVENOUS ONCE
Status: COMPLETED | OUTPATIENT
Start: 2025-08-30 | End: 2025-08-30

## 2025-08-30 RX ORDER — LOSARTAN POTASSIUM 100 MG/1
100 TABLET ORAL DAILY
Status: DISPENSED | OUTPATIENT
Start: 2025-08-31

## 2025-08-30 RX ORDER — IPRATROPIUM BROMIDE AND ALBUTEROL SULFATE 2.5; .5 MG/3ML; MG/3ML
3 SOLUTION RESPIRATORY (INHALATION)
Status: DISCONTINUED | OUTPATIENT
Start: 2025-08-30 | End: 2025-08-30

## 2025-08-30 RX ORDER — ONDANSETRON 2 MG/ML
4 INJECTION INTRAMUSCULAR; INTRAVENOUS EVERY 6 HOURS PRN
Status: ACTIVE | OUTPATIENT
Start: 2025-08-30

## 2025-08-30 RX ORDER — PIPERACILLIN SODIUM, TAZOBACTAM SODIUM 4; .5 G/20ML; G/20ML
4.5 INJECTION, POWDER, LYOPHILIZED, FOR SOLUTION INTRAVENOUS ONCE
Status: DISCONTINUED | OUTPATIENT
Start: 2025-08-30 | End: 2025-08-30

## 2025-08-30 RX ORDER — GUAIFENESIN 200 MG/10ML
200 LIQUID ORAL EVERY 4 HOURS PRN
Status: ACTIVE | OUTPATIENT
Start: 2025-08-30

## 2025-08-30 RX ORDER — LIDOCAINE 40 MG/G
CREAM TOPICAL
Status: ACTIVE | OUTPATIENT
Start: 2025-08-30

## 2025-08-30 RX ORDER — IPRATROPIUM BROMIDE AND ALBUTEROL SULFATE 2.5; .5 MG/3ML; MG/3ML
3 SOLUTION RESPIRATORY (INHALATION) ONCE
Status: COMPLETED | OUTPATIENT
Start: 2025-08-30 | End: 2025-08-30

## 2025-08-30 RX ORDER — AMOXICILLIN 250 MG
1 CAPSULE ORAL 2 TIMES DAILY PRN
Status: ACTIVE | OUTPATIENT
Start: 2025-08-30

## 2025-08-30 RX ORDER — DOCUSATE SODIUM 100 MG/1
100 CAPSULE, LIQUID FILLED ORAL DAILY
Status: DISPENSED | OUTPATIENT
Start: 2025-08-31

## 2025-08-30 RX ORDER — ONDANSETRON 4 MG/1
4 TABLET, ORALLY DISINTEGRATING ORAL EVERY 6 HOURS PRN
Status: ACTIVE | OUTPATIENT
Start: 2025-08-30

## 2025-08-30 RX ORDER — AMOXICILLIN 250 MG
2 CAPSULE ORAL 2 TIMES DAILY PRN
Status: ACTIVE | OUTPATIENT
Start: 2025-08-30

## 2025-08-30 RX ORDER — NALOXONE HYDROCHLORIDE 0.4 MG/ML
0.2 INJECTION, SOLUTION INTRAMUSCULAR; INTRAVENOUS; SUBCUTANEOUS
Status: ACTIVE | OUTPATIENT
Start: 2025-08-30

## 2025-08-30 RX ORDER — FUROSEMIDE 10 MG/ML
20 INJECTION INTRAMUSCULAR; INTRAVENOUS ONCE
Status: COMPLETED | OUTPATIENT
Start: 2025-08-30 | End: 2025-08-30

## 2025-08-30 RX ORDER — ACETAMINOPHEN 650 MG/1
650 SUPPOSITORY RECTAL EVERY 4 HOURS PRN
Status: ACTIVE | OUTPATIENT
Start: 2025-08-30

## 2025-08-30 RX ORDER — DOCUSATE SODIUM 100 MG/1
100 CAPSULE, LIQUID FILLED ORAL 2 TIMES DAILY
Status: DISCONTINUED | OUTPATIENT
Start: 2025-08-30 | End: 2025-09-01

## 2025-08-30 RX ORDER — AMLODIPINE BESYLATE 10 MG/1
10 TABLET ORAL DAILY
Status: DISCONTINUED | OUTPATIENT
Start: 2025-08-31 | End: 2025-08-31

## 2025-08-30 RX ORDER — CYCLOBENZAPRINE HCL 10 MG
10 TABLET ORAL 3 TIMES DAILY PRN
Status: ACTIVE | OUTPATIENT
Start: 2025-08-30

## 2025-08-30 RX ORDER — NALOXONE HYDROCHLORIDE 0.4 MG/ML
0.4 INJECTION, SOLUTION INTRAMUSCULAR; INTRAVENOUS; SUBCUTANEOUS
Status: ACTIVE | OUTPATIENT
Start: 2025-08-30

## 2025-08-30 RX ORDER — AZITHROMYCIN 500 MG/5ML
500 INJECTION, POWDER, LYOPHILIZED, FOR SOLUTION INTRAVENOUS EVERY 24 HOURS
Status: DISCONTINUED | OUTPATIENT
Start: 2025-08-30 | End: 2025-08-30

## 2025-08-30 RX ORDER — NICOTINE 21 MG/24HR
1 PATCH, TRANSDERMAL 24 HOURS TRANSDERMAL DAILY
Status: DISPENSED | OUTPATIENT
Start: 2025-08-30

## 2025-08-30 RX ORDER — CEFTRIAXONE 2 G/1
2 INJECTION, POWDER, FOR SOLUTION INTRAMUSCULAR; INTRAVENOUS EVERY 24 HOURS
Status: DISPENSED | OUTPATIENT
Start: 2025-08-31 | End: 2025-09-07

## 2025-08-30 RX ORDER — AZITHROMYCIN 500 MG/5ML
500 INJECTION, POWDER, LYOPHILIZED, FOR SOLUTION INTRAVENOUS ONCE
Status: DISCONTINUED | OUTPATIENT
Start: 2025-08-30 | End: 2025-08-30

## 2025-08-30 RX ORDER — ALBUTEROL SULFATE 0.83 MG/ML
2.5 SOLUTION RESPIRATORY (INHALATION) EVERY 6 HOURS PRN
Status: DISCONTINUED | OUTPATIENT
Start: 2025-08-30 | End: 2025-08-30 | Stop reason: DRUGHIGH

## 2025-08-30 RX ORDER — CALCIUM CARBONATE 500 MG/1
1000 TABLET, CHEWABLE ORAL 4 TIMES DAILY PRN
Status: ACTIVE | OUTPATIENT
Start: 2025-08-30

## 2025-08-30 RX ORDER — ALBUTEROL SULFATE 0.83 MG/ML
3 SOLUTION RESPIRATORY (INHALATION)
Status: ACTIVE | OUTPATIENT
Start: 2025-08-30

## 2025-08-30 RX ORDER — METOPROLOL TARTRATE 50 MG
50 TABLET ORAL 2 TIMES DAILY
Status: DISPENSED | OUTPATIENT
Start: 2025-08-30

## 2025-08-30 RX ADMIN — AZITHROMYCIN MONOHYDRATE 500 MG: 500 INJECTION, POWDER, LYOPHILIZED, FOR SOLUTION INTRAVENOUS at 17:30

## 2025-08-30 RX ADMIN — IOHEXOL 77 ML: 350 INJECTION, SOLUTION INTRAVENOUS at 16:51

## 2025-08-30 RX ADMIN — METHYLPREDNISOLONE SODIUM SUCCINATE 125 MG: 125 INJECTION, POWDER, FOR SOLUTION INTRAMUSCULAR; INTRAVENOUS at 16:10

## 2025-08-30 RX ADMIN — APIXABAN 5 MG: 5 TABLET, FILM COATED ORAL at 22:38

## 2025-08-30 RX ADMIN — SODIUM CHLORIDE 500 ML: 0.9 INJECTION, SOLUTION INTRAVENOUS at 16:11

## 2025-08-30 RX ADMIN — FUROSEMIDE 20 MG: 10 INJECTION, SOLUTION INTRAMUSCULAR; INTRAVENOUS at 19:01

## 2025-08-30 RX ADMIN — SODIUM CHLORIDE 70 ML: 9 INJECTION, SOLUTION INTRAVENOUS at 16:51

## 2025-08-30 RX ADMIN — IPRATROPIUM BROMIDE AND ALBUTEROL SULFATE 3 ML: .5; 3 SOLUTION RESPIRATORY (INHALATION) at 13:52

## 2025-08-30 RX ADMIN — DOCUSATE SODIUM 100 MG: 100 CAPSULE, LIQUID FILLED ORAL at 22:38

## 2025-08-30 RX ADMIN — METOPROLOL TARTRATE 50 MG: 50 TABLET, FILM COATED ORAL at 22:49

## 2025-08-30 RX ADMIN — CEFTRIAXONE 2 G: 2 INJECTION, POWDER, FOR SOLUTION INTRAMUSCULAR; INTRAVENOUS at 16:11

## 2025-08-30 RX ADMIN — NICOTINE 1 PATCH: 14 PATCH, EXTENDED RELEASE TRANSDERMAL at 22:38

## 2025-08-30 RX ADMIN — ATORVASTATIN CALCIUM 10 MG: 10 TABLET, FILM COATED ORAL at 22:38

## 2025-08-30 ASSESSMENT — ACTIVITIES OF DAILY LIVING (ADL)
ADLS_ACUITY_SCORE: 59

## 2025-08-30 ASSESSMENT — COLUMBIA-SUICIDE SEVERITY RATING SCALE - C-SSRS
6. HAVE YOU EVER DONE ANYTHING, STARTED TO DO ANYTHING, OR PREPARED TO DO ANYTHING TO END YOUR LIFE?: NO
2. HAVE YOU ACTUALLY HAD ANY THOUGHTS OF KILLING YOURSELF IN THE PAST MONTH?: NO
1. IN THE PAST MONTH, HAVE YOU WISHED YOU WERE DEAD OR WISHED YOU COULD GO TO SLEEP AND NOT WAKE UP?: NO

## 2025-08-31 ENCOUNTER — APPOINTMENT (OUTPATIENT)
Dept: CARDIOLOGY | Facility: CLINIC | Age: OVER 89
End: 2025-08-31
Attending: INTERNAL MEDICINE
Payer: COMMERCIAL

## 2025-08-31 LAB
ANION GAP SERPL CALCULATED.3IONS-SCNC: 8 MMOL/L (ref 7–15)
BUN SERPL-MCNC: 24.6 MG/DL (ref 8–23)
C PNEUM DNA SPEC QL NAA+PROBE: NOT DETECTED
CALCIUM SERPL-MCNC: 8.3 MG/DL (ref 8.8–10.4)
CHLORIDE SERPL-SCNC: 105 MMOL/L (ref 98–107)
CREAT SERPL-MCNC: 1.23 MG/DL (ref 0.67–1.17)
CREAT UR-MCNC: 34.9 MG/DL
EGFRCR SERPLBLD CKD-EPI 2021: 56 ML/MIN/1.73M2
FLUAV H1 2009 PAND RNA SPEC QL NAA+PROBE: NOT DETECTED
FLUAV H1 RNA SPEC QL NAA+PROBE: NOT DETECTED
FLUAV H3 RNA SPEC QL NAA+PROBE: NOT DETECTED
FLUAV RNA SPEC QL NAA+PROBE: NOT DETECTED
FLUBV RNA SPEC QL NAA+PROBE: NOT DETECTED
GLUCOSE SERPL-MCNC: 206 MG/DL (ref 70–99)
HADV DNA SPEC QL NAA+PROBE: NOT DETECTED
HCO3 SERPL-SCNC: 27 MMOL/L (ref 22–29)
HCOV PNL SPEC NAA+PROBE: NOT DETECTED
HMPV RNA SPEC QL NAA+PROBE: NOT DETECTED
HOLD SPECIMEN: NORMAL
HPIV1 RNA SPEC QL NAA+PROBE: NOT DETECTED
HPIV2 RNA SPEC QL NAA+PROBE: NOT DETECTED
HPIV3 RNA SPEC QL NAA+PROBE: NOT DETECTED
HPIV4 RNA SPEC QL NAA+PROBE: NOT DETECTED
LVEF ECHO: NORMAL
M PNEUMO DNA SPEC QL NAA+PROBE: NOT DETECTED
MICROALBUMIN UR-MCNC: 33.2 MG/L
MICROALBUMIN/CREAT UR: 95.13 MG/G CR (ref 0–17)
POTASSIUM SERPL-SCNC: 4.8 MMOL/L (ref 3.4–5.3)
RSV RNA SPEC QL NAA+PROBE: NOT DETECTED
RSV RNA SPEC QL NAA+PROBE: NOT DETECTED
RV+EV RNA SPEC QL NAA+PROBE: NOT DETECTED
SARS-COV-2 RNA RESP QL NAA+PROBE: NEGATIVE
SODIUM SERPL-SCNC: 140 MMOL/L (ref 135–145)

## 2025-08-31 PROCEDURE — 93306 TTE W/DOPPLER COMPLETE: CPT | Mod: 26 | Performed by: INTERNAL MEDICINE

## 2025-08-31 PROCEDURE — 93306 TTE W/DOPPLER COMPLETE: CPT

## 2025-08-31 PROCEDURE — 250N000013 HC RX MED GY IP 250 OP 250 PS 637: Performed by: INTERNAL MEDICINE

## 2025-08-31 PROCEDURE — 99233 SBSQ HOSP IP/OBS HIGH 50: CPT | Performed by: INTERNAL MEDICINE

## 2025-08-31 PROCEDURE — 87486 CHLMYD PNEUM DNA AMP PROBE: CPT | Performed by: INTERNAL MEDICINE

## 2025-08-31 PROCEDURE — 36415 COLL VENOUS BLD VENIPUNCTURE: CPT | Performed by: INTERNAL MEDICINE

## 2025-08-31 PROCEDURE — 82310 ASSAY OF CALCIUM: CPT | Performed by: INTERNAL MEDICINE

## 2025-08-31 PROCEDURE — 250N000012 HC RX MED GY IP 250 OP 636 PS 637: Performed by: INTERNAL MEDICINE

## 2025-08-31 PROCEDURE — 250N000011 HC RX IP 250 OP 636: Performed by: INTERNAL MEDICINE

## 2025-08-31 PROCEDURE — 120N000001 HC R&B MED SURG/OB

## 2025-08-31 PROCEDURE — 87635 SARS-COV-2 COVID-19 AMP PRB: CPT | Performed by: INTERNAL MEDICINE

## 2025-08-31 PROCEDURE — 87070 CULTURE OTHR SPECIMN AEROBIC: CPT | Performed by: INTERNAL MEDICINE

## 2025-08-31 PROCEDURE — 258N000003 HC RX IP 258 OP 636: Performed by: INTERNAL MEDICINE

## 2025-08-31 PROCEDURE — 82570 ASSAY OF URINE CREATININE: CPT | Performed by: INTERNAL MEDICINE

## 2025-08-31 RX ORDER — DEXTROSE MONOHYDRATE 25 G/50ML
25-50 INJECTION, SOLUTION INTRAVENOUS
Status: ACTIVE | OUTPATIENT
Start: 2025-08-31

## 2025-08-31 RX ORDER — FAMOTIDINE 20 MG/1
20 TABLET, FILM COATED ORAL DAILY
Status: DISPENSED | OUTPATIENT
Start: 2025-08-31

## 2025-08-31 RX ORDER — FUROSEMIDE 10 MG/ML
20 INJECTION INTRAMUSCULAR; INTRAVENOUS ONCE
Status: COMPLETED | OUTPATIENT
Start: 2025-08-31 | End: 2025-08-31

## 2025-08-31 RX ORDER — NICOTINE POLACRILEX 4 MG
15-30 LOZENGE BUCCAL
Status: ACTIVE | OUTPATIENT
Start: 2025-08-31

## 2025-08-31 RX ORDER — PREDNISONE 20 MG/1
40 TABLET ORAL DAILY
Status: DISPENSED | OUTPATIENT
Start: 2025-08-31

## 2025-08-31 RX ORDER — GLIPIZIDE 5 MG/1
5 TABLET, FILM COATED, EXTENDED RELEASE ORAL
Status: DISPENSED | OUTPATIENT
Start: 2025-09-01

## 2025-08-31 RX ADMIN — CEFTRIAXONE 2 G: 2 INJECTION, POWDER, FOR SOLUTION INTRAMUSCULAR; INTRAVENOUS at 12:47

## 2025-08-31 RX ADMIN — FAMOTIDINE 20 MG: 20 TABLET, FILM COATED ORAL at 12:28

## 2025-08-31 RX ADMIN — METOPROLOL TARTRATE 50 MG: 50 TABLET, FILM COATED ORAL at 11:35

## 2025-08-31 RX ADMIN — ATORVASTATIN CALCIUM 10 MG: 10 TABLET, FILM COATED ORAL at 20:34

## 2025-08-31 RX ADMIN — APIXABAN 5 MG: 5 TABLET, FILM COATED ORAL at 09:29

## 2025-08-31 RX ADMIN — FUROSEMIDE 20 MG: 10 INJECTION, SOLUTION INTRAMUSCULAR; INTRAVENOUS at 11:36

## 2025-08-31 RX ADMIN — DOCUSATE SODIUM 100 MG: 100 CAPSULE, LIQUID FILLED ORAL at 20:34

## 2025-08-31 RX ADMIN — PREDNISONE 40 MG: 20 TABLET ORAL at 11:36

## 2025-08-31 RX ADMIN — AZITHROMYCIN MONOHYDRATE 250 MG: 500 INJECTION, POWDER, LYOPHILIZED, FOR SOLUTION INTRAVENOUS at 11:36

## 2025-08-31 RX ADMIN — APIXABAN 5 MG: 5 TABLET, FILM COATED ORAL at 20:32

## 2025-08-31 RX ADMIN — CYANOCOBALAMIN TAB 1000 MCG 1000 MCG: 1000 TAB at 09:29

## 2025-08-31 RX ADMIN — METOPROLOL TARTRATE 50 MG: 50 TABLET, FILM COATED ORAL at 20:32

## 2025-08-31 RX ADMIN — Medication 3 MG: at 21:22

## 2025-08-31 ASSESSMENT — ACTIVITIES OF DAILY LIVING (ADL)
ADLS_ACUITY_SCORE: 33
ADLS_ACUITY_SCORE: 33
ADLS_ACUITY_SCORE: 37
ADLS_ACUITY_SCORE: 59
ADLS_ACUITY_SCORE: 37
ADLS_ACUITY_SCORE: 33
ADLS_ACUITY_SCORE: 40
ADLS_ACUITY_SCORE: 33
ADLS_ACUITY_SCORE: 33
ADLS_ACUITY_SCORE: 59
ADLS_ACUITY_SCORE: 37
ADLS_ACUITY_SCORE: 33
ADLS_ACUITY_SCORE: 37
ADLS_ACUITY_SCORE: 33

## 2025-09-01 LAB
ANION GAP SERPL CALCULATED.3IONS-SCNC: 7 MMOL/L (ref 7–15)
BUN SERPL-MCNC: 25.3 MG/DL (ref 8–23)
CALCIUM SERPL-MCNC: 8.2 MG/DL (ref 8.8–10.4)
CHLORIDE SERPL-SCNC: 104 MMOL/L (ref 98–107)
CREAT SERPL-MCNC: 1.1 MG/DL (ref 0.67–1.17)
EGFRCR SERPLBLD CKD-EPI 2021: 64 ML/MIN/1.73M2
ERYTHROCYTE [DISTWIDTH] IN BLOOD BY AUTOMATED COUNT: 17 % (ref 10–15)
GLUCOSE BLDC GLUCOMTR-MCNC: 136 MG/DL (ref 70–99)
GLUCOSE BLDC GLUCOMTR-MCNC: 180 MG/DL (ref 70–99)
GLUCOSE SERPL-MCNC: 133 MG/DL (ref 70–99)
HCO3 SERPL-SCNC: 28 MMOL/L (ref 22–29)
HCT VFR BLD AUTO: 32.3 % (ref 40–53)
HGB BLD-MCNC: 10.8 G/DL (ref 13.3–17.7)
MCH RBC QN AUTO: 33.2 PG (ref 26.5–33)
MCHC RBC AUTO-ENTMCNC: 33.4 G/DL (ref 31.5–36.5)
MCV RBC AUTO: 99.4 FL (ref 78–100)
PLATELET # BLD AUTO: 123 10E3/UL (ref 150–450)
POTASSIUM SERPL-SCNC: 4.6 MMOL/L (ref 3.4–5.3)
RBC # BLD AUTO: 3.25 10E6/UL (ref 4.4–5.9)
SODIUM SERPL-SCNC: 139 MMOL/L (ref 135–145)
WBC # BLD AUTO: 7.99 10E3/UL (ref 4–11)

## 2025-09-01 PROCEDURE — 99233 SBSQ HOSP IP/OBS HIGH 50: CPT | Performed by: INTERNAL MEDICINE

## 2025-09-01 PROCEDURE — 250N000011 HC RX IP 250 OP 636: Performed by: INTERNAL MEDICINE

## 2025-09-01 PROCEDURE — 250N000012 HC RX MED GY IP 250 OP 636 PS 637: Performed by: INTERNAL MEDICINE

## 2025-09-01 PROCEDURE — 120N000001 HC R&B MED SURG/OB

## 2025-09-01 PROCEDURE — 250N000013 HC RX MED GY IP 250 OP 250 PS 637: Performed by: INTERNAL MEDICINE

## 2025-09-01 PROCEDURE — 36415 COLL VENOUS BLD VENIPUNCTURE: CPT | Performed by: INTERNAL MEDICINE

## 2025-09-01 PROCEDURE — 92610 EVALUATE SWALLOWING FUNCTION: CPT | Mod: GN | Performed by: SPEECH-LANGUAGE PATHOLOGIST

## 2025-09-01 PROCEDURE — 85014 HEMATOCRIT: CPT | Performed by: INTERNAL MEDICINE

## 2025-09-01 PROCEDURE — 258N000003 HC RX IP 258 OP 636: Performed by: INTERNAL MEDICINE

## 2025-09-01 PROCEDURE — 82310 ASSAY OF CALCIUM: CPT | Performed by: INTERNAL MEDICINE

## 2025-09-01 PROCEDURE — 92526 ORAL FUNCTION THERAPY: CPT | Mod: GN | Performed by: SPEECH-LANGUAGE PATHOLOGIST

## 2025-09-01 RX ORDER — LORATADINE 10 MG/1
10 TABLET ORAL DAILY
Status: DISPENSED | OUTPATIENT
Start: 2025-09-01

## 2025-09-01 RX ORDER — FUROSEMIDE 10 MG/ML
40 INJECTION INTRAMUSCULAR; INTRAVENOUS EVERY 8 HOURS
Status: COMPLETED | OUTPATIENT
Start: 2025-09-01 | End: 2025-09-02

## 2025-09-01 RX ORDER — NICOTINE POLACRILEX 4 MG
15-30 LOZENGE BUCCAL
Status: DISCONTINUED | OUTPATIENT
Start: 2025-09-01 | End: 2025-09-01

## 2025-09-01 RX ORDER — DEXTROSE MONOHYDRATE 25 G/50ML
25-50 INJECTION, SOLUTION INTRAVENOUS
Status: DISCONTINUED | OUTPATIENT
Start: 2025-09-01 | End: 2025-09-01

## 2025-09-01 RX ADMIN — AZITHROMYCIN MONOHYDRATE 250 MG: 500 INJECTION, POWDER, LYOPHILIZED, FOR SOLUTION INTRAVENOUS at 12:03

## 2025-09-01 RX ADMIN — METOPROLOL TARTRATE 50 MG: 50 TABLET, FILM COATED ORAL at 20:46

## 2025-09-01 RX ADMIN — FUROSEMIDE 40 MG: 10 INJECTION, SOLUTION INTRAMUSCULAR; INTRAVENOUS at 20:52

## 2025-09-01 RX ADMIN — Medication 3 MG: at 23:10

## 2025-09-01 RX ADMIN — ATORVASTATIN CALCIUM 10 MG: 10 TABLET, FILM COATED ORAL at 20:47

## 2025-09-01 RX ADMIN — CEFTRIAXONE 2 G: 2 INJECTION, POWDER, FOR SOLUTION INTRAMUSCULAR; INTRAVENOUS at 15:06

## 2025-09-01 RX ADMIN — PREDNISONE 40 MG: 20 TABLET ORAL at 12:01

## 2025-09-01 RX ADMIN — APIXABAN 5 MG: 5 TABLET, FILM COATED ORAL at 20:46

## 2025-09-01 RX ADMIN — METOPROLOL TARTRATE 50 MG: 50 TABLET, FILM COATED ORAL at 12:01

## 2025-09-01 RX ADMIN — LORATADINE 10 MG: 10 TABLET ORAL at 15:06

## 2025-09-01 RX ADMIN — FUROSEMIDE 40 MG: 10 INJECTION, SOLUTION INTRAMUSCULAR; INTRAVENOUS at 12:46

## 2025-09-01 RX ADMIN — CYANOCOBALAMIN TAB 1000 MCG 1000 MCG: 1000 TAB at 12:01

## 2025-09-01 RX ADMIN — FAMOTIDINE 20 MG: 20 TABLET, FILM COATED ORAL at 12:01

## 2025-09-01 RX ADMIN — APIXABAN 5 MG: 5 TABLET, FILM COATED ORAL at 12:01

## 2025-09-01 ASSESSMENT — ACTIVITIES OF DAILY LIVING (ADL)
ADLS_ACUITY_SCORE: 37
ADLS_ACUITY_SCORE: 40
ADLS_ACUITY_SCORE: 40
ADLS_ACUITY_SCORE: 37
ADLS_ACUITY_SCORE: 37
ADLS_ACUITY_SCORE: 40
ADLS_ACUITY_SCORE: 40
ADLS_ACUITY_SCORE: 37
ADLS_ACUITY_SCORE: 40
ADLS_ACUITY_SCORE: 37
ADLS_ACUITY_SCORE: 37
ADLS_ACUITY_SCORE: 40
ADLS_ACUITY_SCORE: 37
ADLS_ACUITY_SCORE: 40
ADLS_ACUITY_SCORE: 37
ADLS_ACUITY_SCORE: 40
ADLS_ACUITY_SCORE: 37

## 2025-09-02 LAB
ANION GAP SERPL CALCULATED.3IONS-SCNC: 11 MMOL/L (ref 7–15)
ATRIAL RATE - MUSE: NORMAL BPM
BUN SERPL-MCNC: 30.9 MG/DL (ref 8–23)
CALCIUM SERPL-MCNC: 8.9 MG/DL (ref 8.8–10.4)
CHLORIDE SERPL-SCNC: 96 MMOL/L (ref 98–107)
CREAT SERPL-MCNC: 1.08 MG/DL (ref 0.67–1.17)
DIASTOLIC BLOOD PRESSURE - MUSE: NORMAL MMHG
EGFRCR SERPLBLD CKD-EPI 2021: 66 ML/MIN/1.73M2
ERYTHROCYTE [DISTWIDTH] IN BLOOD BY AUTOMATED COUNT: 17.1 % (ref 10–15)
GLUCOSE BLDC GLUCOMTR-MCNC: 142 MG/DL (ref 70–99)
GLUCOSE BLDC GLUCOMTR-MCNC: 171 MG/DL (ref 70–99)
GLUCOSE BLDC GLUCOMTR-MCNC: 219 MG/DL (ref 70–99)
GLUCOSE BLDC GLUCOMTR-MCNC: 313 MG/DL (ref 70–99)
GLUCOSE BODY FLUID SOURCE: NORMAL
GLUCOSE FLD-MCNC: 177 MG/DL
GLUCOSE SERPL-MCNC: 95 MG/DL (ref 70–99)
HCO3 SERPL-SCNC: 34 MMOL/L (ref 22–29)
HCT VFR BLD AUTO: 36.8 % (ref 40–53)
HGB BLD-MCNC: 12.2 G/DL (ref 13.3–17.7)
INTERPRETATION ECG - MUSE: NORMAL
LD BODY BODY FLUID SOURCE: NORMAL
LDH FLD L TO P-CCNC: 77 U/L
LDH SERPL L TO P-CCNC: 325 U/L (ref 0–250)
MCH RBC QN AUTO: 33.2 PG (ref 26.5–33)
MCHC RBC AUTO-ENTMCNC: 33.2 G/DL (ref 31.5–36.5)
MCV RBC AUTO: 100.3 FL (ref 78–100)
P AXIS - MUSE: NORMAL DEGREES
PLATELET # BLD AUTO: 154 10E3/UL (ref 150–450)
POTASSIUM SERPL-SCNC: 4 MMOL/L (ref 3.4–5.3)
PR INTERVAL - MUSE: NORMAL MS
PROT FLD-MCNC: 2.3 G/DL
PROT SERPL-MCNC: 6.7 G/DL (ref 6.4–8.3)
PROTEIN BODY FLUID SOURCE: NORMAL
QRS DURATION - MUSE: 88 MS
QT - MUSE: 422 MS
QTC - MUSE: 410 MS
R AXIS - MUSE: 48 DEGREES
RBC # BLD AUTO: 3.67 10E6/UL (ref 4.4–5.9)
SODIUM SERPL-SCNC: 141 MMOL/L (ref 135–145)
SYSTOLIC BLOOD PRESSURE - MUSE: NORMAL MMHG
T AXIS - MUSE: 39 DEGREES
VENTRICULAR RATE- MUSE: 57 BPM
WBC # BLD AUTO: 8.4 10E3/UL (ref 4–11)

## 2025-09-02 PROCEDURE — 120N000001 HC R&B MED SURG/OB

## 2025-09-02 PROCEDURE — 250N000013 HC RX MED GY IP 250 OP 250 PS 637: Performed by: INTERNAL MEDICINE

## 2025-09-02 PROCEDURE — 99233 SBSQ HOSP IP/OBS HIGH 50: CPT | Performed by: INTERNAL MEDICINE

## 2025-09-02 PROCEDURE — 999N000157 HC STATISTIC RCP TIME EA 10 MIN

## 2025-09-02 PROCEDURE — 80048 BASIC METABOLIC PNL TOTAL CA: CPT | Performed by: INTERNAL MEDICINE

## 2025-09-02 PROCEDURE — 87070 CULTURE OTHR SPECIMN AEROBIC: CPT | Performed by: INTERNAL MEDICINE

## 2025-09-02 PROCEDURE — 84155 ASSAY OF PROTEIN SERUM: CPT | Performed by: INTERNAL MEDICINE

## 2025-09-02 PROCEDURE — 88305 TISSUE EXAM BY PATHOLOGIST: CPT | Mod: TC | Performed by: INTERNAL MEDICINE

## 2025-09-02 PROCEDURE — 258N000003 HC RX IP 258 OP 636: Performed by: INTERNAL MEDICINE

## 2025-09-02 PROCEDURE — 84157 ASSAY OF PROTEIN OTHER: CPT | Performed by: INTERNAL MEDICINE

## 2025-09-02 PROCEDURE — 250N000009 HC RX 250: Performed by: RADIOLOGY

## 2025-09-02 PROCEDURE — 250N000011 HC RX IP 250 OP 636: Performed by: INTERNAL MEDICINE

## 2025-09-02 PROCEDURE — 82945 GLUCOSE OTHER FLUID: CPT | Performed by: INTERNAL MEDICINE

## 2025-09-02 PROCEDURE — 83615 LACTATE (LD) (LDH) ENZYME: CPT | Performed by: INTERNAL MEDICINE

## 2025-09-02 PROCEDURE — 250N000012 HC RX MED GY IP 250 OP 636 PS 637: Performed by: INTERNAL MEDICINE

## 2025-09-02 PROCEDURE — 85018 HEMOGLOBIN: CPT | Performed by: INTERNAL MEDICINE

## 2025-09-02 PROCEDURE — 92526 ORAL FUNCTION THERAPY: CPT | Mod: GN

## 2025-09-02 PROCEDURE — 89050 BODY FLUID CELL COUNT: CPT | Performed by: INTERNAL MEDICINE

## 2025-09-02 PROCEDURE — 36415 COLL VENOUS BLD VENIPUNCTURE: CPT | Performed by: INTERNAL MEDICINE

## 2025-09-02 RX ORDER — FUROSEMIDE 40 MG/1
40 TABLET ORAL DAILY
Status: DISPENSED | OUTPATIENT
Start: 2025-09-02

## 2025-09-02 RX ADMIN — LORATADINE 10 MG: 10 TABLET ORAL at 08:43

## 2025-09-02 RX ADMIN — APIXABAN 5 MG: 5 TABLET, FILM COATED ORAL at 21:37

## 2025-09-02 RX ADMIN — APIXABAN 5 MG: 5 TABLET, FILM COATED ORAL at 08:45

## 2025-09-02 RX ADMIN — PREDNISONE 40 MG: 20 TABLET ORAL at 08:46

## 2025-09-02 RX ADMIN — CEFTRIAXONE 2 G: 2 INJECTION, POWDER, FOR SOLUTION INTRAMUSCULAR; INTRAVENOUS at 12:40

## 2025-09-02 RX ADMIN — FUROSEMIDE 40 MG: 10 INJECTION, SOLUTION INTRAMUSCULAR; INTRAVENOUS at 04:49

## 2025-09-02 RX ADMIN — DOCUSATE SODIUM 100 MG: 100 CAPSULE, LIQUID FILLED ORAL at 08:43

## 2025-09-02 RX ADMIN — AZITHROMYCIN MONOHYDRATE 250 MG: 500 INJECTION, POWDER, LYOPHILIZED, FOR SOLUTION INTRAVENOUS at 12:32

## 2025-09-02 RX ADMIN — INSULIN ASPART 1 UNITS: 100 INJECTION, SOLUTION INTRAVENOUS; SUBCUTANEOUS at 18:01

## 2025-09-02 RX ADMIN — CYANOCOBALAMIN TAB 1000 MCG 1000 MCG: 1000 TAB at 08:44

## 2025-09-02 RX ADMIN — FUROSEMIDE 40 MG: 40 TABLET ORAL at 15:24

## 2025-09-02 RX ADMIN — FAMOTIDINE 20 MG: 20 TABLET, FILM COATED ORAL at 08:44

## 2025-09-02 RX ADMIN — ATORVASTATIN CALCIUM 10 MG: 10 TABLET, FILM COATED ORAL at 21:37

## 2025-09-02 RX ADMIN — LIDOCAINE HYDROCHLORIDE 10 ML: 10 INJECTION, SOLUTION INFILTRATION; PERINEURAL at 14:00

## 2025-09-02 ASSESSMENT — ACTIVITIES OF DAILY LIVING (ADL)
ADLS_ACUITY_SCORE: 37
ADLS_ACUITY_SCORE: 40
ADLS_ACUITY_SCORE: 37
ADLS_ACUITY_SCORE: 40
ADLS_ACUITY_SCORE: 37
ADLS_ACUITY_SCORE: 40
ADLS_ACUITY_SCORE: 37
ADLS_ACUITY_SCORE: 40
ADLS_ACUITY_SCORE: 40
ADLS_ACUITY_SCORE: 37

## 2025-09-03 LAB
% LINING CELLS, BODY FLUID: 20 %
ALBUMIN SERPL BCG-MCNC: 3.1 G/DL (ref 3.5–5.2)
ALP SERPL-CCNC: 150 U/L (ref 40–150)
ALT SERPL W P-5'-P-CCNC: 18 U/L (ref 0–70)
ANION GAP SERPL CALCULATED.3IONS-SCNC: 6 MMOL/L (ref 7–15)
APPEARANCE FLD: ABNORMAL
AST SERPL W P-5'-P-CCNC: 18 U/L (ref 0–45)
BILIRUB SERPL-MCNC: 0.2 MG/DL
BUN SERPL-MCNC: 31.1 MG/DL (ref 8–23)
CALCIUM SERPL-MCNC: 8.4 MG/DL (ref 8.8–10.4)
CHLORIDE SERPL-SCNC: 97 MMOL/L (ref 98–107)
COLOR FLD: ABNORMAL
CREAT SERPL-MCNC: 0.97 MG/DL (ref 0.67–1.17)
EGFRCR SERPLBLD CKD-EPI 2021: 75 ML/MIN/1.73M2
ERYTHROCYTE [DISTWIDTH] IN BLOOD BY AUTOMATED COUNT: 16.5 % (ref 10–15)
GLUCOSE BLDC GLUCOMTR-MCNC: 108 MG/DL (ref 70–99)
GLUCOSE BLDC GLUCOMTR-MCNC: 143 MG/DL (ref 70–99)
GLUCOSE BLDC GLUCOMTR-MCNC: 158 MG/DL (ref 70–99)
GLUCOSE BLDC GLUCOMTR-MCNC: 181 MG/DL (ref 70–99)
GLUCOSE BLDC GLUCOMTR-MCNC: 224 MG/DL (ref 70–99)
GLUCOSE SERPL-MCNC: 147 MG/DL (ref 70–99)
HCO3 SERPL-SCNC: 36 MMOL/L (ref 22–29)
HCT VFR BLD AUTO: 34.2 % (ref 40–53)
HGB BLD-MCNC: 11.5 G/DL (ref 13.3–17.7)
LYMPHOCYTES NFR FLD MANUAL: 66 %
MCH RBC QN AUTO: 32.9 PG (ref 26.5–33)
MCHC RBC AUTO-ENTMCNC: 33.6 G/DL (ref 31.5–36.5)
MCV RBC AUTO: 97.7 FL (ref 78–100)
MONOS+MACROS NFR FLD MANUAL: 11 %
NEUTS BAND NFR FLD MANUAL: 3 %
PATH REV: ABNORMAL
PLATELET # BLD AUTO: 137 10E3/UL (ref 150–450)
POTASSIUM SERPL-SCNC: 3.5 MMOL/L (ref 3.4–5.3)
PROT SERPL-MCNC: 5.9 G/DL (ref 6.4–8.3)
RBC # BLD AUTO: 3.5 10E6/UL (ref 4.4–5.9)
SODIUM SERPL-SCNC: 139 MMOL/L (ref 135–145)
SPECIMEN SOURCE FLD: ABNORMAL
WBC # BLD AUTO: 8.77 10E3/UL (ref 4–11)
WBC # FLD AUTO: 430 /UL

## 2025-09-03 PROCEDURE — 92611 MOTION FLUOROSCOPY/SWALLOW: CPT | Mod: GN

## 2025-09-03 PROCEDURE — 82040 ASSAY OF SERUM ALBUMIN: CPT | Performed by: INTERNAL MEDICINE

## 2025-09-03 PROCEDURE — 250N000012 HC RX MED GY IP 250 OP 636 PS 637: Performed by: INTERNAL MEDICINE

## 2025-09-03 PROCEDURE — 85027 COMPLETE CBC AUTOMATED: CPT | Performed by: INTERNAL MEDICINE

## 2025-09-03 PROCEDURE — 250N000011 HC RX IP 250 OP 636: Performed by: INTERNAL MEDICINE

## 2025-09-03 PROCEDURE — 250N000013 HC RX MED GY IP 250 OP 250 PS 637: Performed by: INTERNAL MEDICINE

## 2025-09-03 PROCEDURE — 92526 ORAL FUNCTION THERAPY: CPT | Mod: GN

## 2025-09-03 PROCEDURE — 36415 COLL VENOUS BLD VENIPUNCTURE: CPT | Performed by: INTERNAL MEDICINE

## 2025-09-03 PROCEDURE — 99233 SBSQ HOSP IP/OBS HIGH 50: CPT | Performed by: INTERNAL MEDICINE

## 2025-09-03 PROCEDURE — 120N000001 HC R&B MED SURG/OB

## 2025-09-03 PROCEDURE — 258N000003 HC RX IP 258 OP 636: Performed by: INTERNAL MEDICINE

## 2025-09-03 RX ORDER — BARIUM SULFATE 400 MG/ML
SUSPENSION ORAL ONCE
Status: DISCONTINUED | OUTPATIENT
Start: 2025-09-03 | End: 2025-09-03

## 2025-09-03 RX ORDER — BARIUM SULFATE 400 MG/ML
SUSPENSION ORAL ONCE
Status: COMPLETED | OUTPATIENT
Start: 2025-09-03 | End: 2025-09-03

## 2025-09-03 RX ADMIN — METOPROLOL TARTRATE 50 MG: 50 TABLET, FILM COATED ORAL at 20:30

## 2025-09-03 RX ADMIN — Medication 3 MG: at 02:14

## 2025-09-03 RX ADMIN — Medication 3 MG: at 23:17

## 2025-09-03 RX ADMIN — CYANOCOBALAMIN TAB 1000 MCG 1000 MCG: 1000 TAB at 10:27

## 2025-09-03 RX ADMIN — FUROSEMIDE 40 MG: 40 TABLET ORAL at 10:27

## 2025-09-03 RX ADMIN — INSULIN ASPART 1 UNITS: 100 INJECTION, SOLUTION INTRAVENOUS; SUBCUTANEOUS at 18:02

## 2025-09-03 RX ADMIN — FAMOTIDINE 20 MG: 20 TABLET, FILM COATED ORAL at 10:29

## 2025-09-03 RX ADMIN — LORATADINE 10 MG: 10 TABLET ORAL at 10:26

## 2025-09-03 RX ADMIN — ATORVASTATIN CALCIUM 10 MG: 10 TABLET, FILM COATED ORAL at 20:30

## 2025-09-03 RX ADMIN — INSULIN ASPART 1 UNITS: 100 INJECTION, SOLUTION INTRAVENOUS; SUBCUTANEOUS at 15:03

## 2025-09-03 RX ADMIN — PREDNISONE 40 MG: 20 TABLET ORAL at 10:30

## 2025-09-03 RX ADMIN — BARIUM SULFATE 30 ML: 400 SUSPENSION ORAL at 09:16

## 2025-09-03 RX ADMIN — CEFTRIAXONE 2 G: 2 INJECTION, POWDER, FOR SOLUTION INTRAMUSCULAR; INTRAVENOUS at 12:53

## 2025-09-03 RX ADMIN — APIXABAN 5 MG: 5 TABLET, FILM COATED ORAL at 10:26

## 2025-09-03 RX ADMIN — METOPROLOL TARTRATE 50 MG: 50 TABLET, FILM COATED ORAL at 10:28

## 2025-09-03 RX ADMIN — APIXABAN 5 MG: 5 TABLET, FILM COATED ORAL at 20:29

## 2025-09-03 RX ADMIN — AZITHROMYCIN MONOHYDRATE 250 MG: 500 INJECTION, POWDER, LYOPHILIZED, FOR SOLUTION INTRAVENOUS at 11:49

## 2025-09-03 RX ADMIN — DOCUSATE SODIUM 100 MG: 100 CAPSULE, LIQUID FILLED ORAL at 10:28

## 2025-09-03 ASSESSMENT — ACTIVITIES OF DAILY LIVING (ADL)
ADLS_ACUITY_SCORE: 40

## 2025-09-04 VITALS
WEIGHT: 136.56 LBS | TEMPERATURE: 97.6 F | BODY MASS INDEX: 19.12 KG/M2 | RESPIRATION RATE: 16 BRPM | OXYGEN SATURATION: 93 % | HEIGHT: 71 IN | HEART RATE: 100 BPM | DIASTOLIC BLOOD PRESSURE: 68 MMHG | SYSTOLIC BLOOD PRESSURE: 120 MMHG

## 2025-09-04 LAB
ANION GAP SERPL CALCULATED.3IONS-SCNC: 9 MMOL/L (ref 7–15)
BACTERIA PLR CULT: NORMAL
BACTERIA SPEC CULT: NO GROWTH
BACTERIA SPEC CULT: NO GROWTH
BACTERIA SPT CULT: ABNORMAL
BUN SERPL-MCNC: 29.7 MG/DL (ref 8–23)
CALCIUM SERPL-MCNC: 8.1 MG/DL (ref 8.8–10.4)
CHLORIDE SERPL-SCNC: 96 MMOL/L (ref 98–107)
CREAT SERPL-MCNC: 1.07 MG/DL (ref 0.67–1.17)
EGFRCR SERPLBLD CKD-EPI 2021: 66 ML/MIN/1.73M2
ERYTHROCYTE [DISTWIDTH] IN BLOOD BY AUTOMATED COUNT: 16.6 % (ref 10–15)
GLUCOSE BLDC GLUCOMTR-MCNC: 116 MG/DL (ref 70–99)
GLUCOSE BLDC GLUCOMTR-MCNC: 165 MG/DL (ref 70–99)
GLUCOSE BLDC GLUCOMTR-MCNC: 177 MG/DL (ref 70–99)
GLUCOSE BLDC GLUCOMTR-MCNC: 65 MG/DL (ref 70–99)
GLUCOSE BLDC GLUCOMTR-MCNC: 79 MG/DL (ref 70–99)
GLUCOSE SERPL-MCNC: 121 MG/DL (ref 70–99)
GRAM STAIN RESULT: ABNORMAL
GRAM STAIN RESULT: NORMAL
GRAM STAIN RESULT: NORMAL
HCO3 SERPL-SCNC: 36 MMOL/L (ref 22–29)
HCT VFR BLD AUTO: 32.7 % (ref 40–53)
HGB BLD-MCNC: 11 G/DL (ref 13.3–17.7)
MCH RBC QN AUTO: 32.8 PG (ref 26.5–33)
MCHC RBC AUTO-ENTMCNC: 33.6 G/DL (ref 31.5–36.5)
MCV RBC AUTO: 97.6 FL (ref 78–100)
PATH REPORT.COMMENTS IMP SPEC: NORMAL
PATH REPORT.FINAL DX SPEC: NORMAL
PATH REPORT.GROSS SPEC: NORMAL
PATH REPORT.MICROSCOPIC SPEC OTHER STN: NORMAL
PATH REPORT.RELEVANT HX SPEC: NORMAL
PLATELET # BLD AUTO: 117 10E3/UL (ref 150–450)
POTASSIUM SERPL-SCNC: 3.5 MMOL/L (ref 3.4–5.3)
RBC # BLD AUTO: 3.35 10E6/UL (ref 4.4–5.9)
SODIUM SERPL-SCNC: 141 MMOL/L (ref 135–145)
WBC # BLD AUTO: 8.15 10E3/UL (ref 4–11)

## 2025-09-04 PROCEDURE — 255N000002 HC RX 255 OP 636: Performed by: INTERNAL MEDICINE

## 2025-09-04 PROCEDURE — 250N000013 HC RX MED GY IP 250 OP 250 PS 637: Performed by: INTERNAL MEDICINE

## 2025-09-04 PROCEDURE — 82374 ASSAY BLOOD CARBON DIOXIDE: CPT | Performed by: INTERNAL MEDICINE

## 2025-09-04 PROCEDURE — 120N000001 HC R&B MED SURG/OB

## 2025-09-04 PROCEDURE — 36415 COLL VENOUS BLD VENIPUNCTURE: CPT | Performed by: INTERNAL MEDICINE

## 2025-09-04 PROCEDURE — 85027 COMPLETE CBC AUTOMATED: CPT | Performed by: INTERNAL MEDICINE

## 2025-09-04 PROCEDURE — 250N000011 HC RX IP 250 OP 636: Performed by: INTERNAL MEDICINE

## 2025-09-04 PROCEDURE — A9585 GADOBUTROL INJECTION: HCPCS | Performed by: INTERNAL MEDICINE

## 2025-09-04 RX ORDER — GADOBUTROL 604.72 MG/ML
6 INJECTION INTRAVENOUS ONCE
Status: COMPLETED | OUTPATIENT
Start: 2025-09-04 | End: 2025-09-04

## 2025-09-04 RX ADMIN — ATORVASTATIN CALCIUM 10 MG: 10 TABLET, FILM COATED ORAL at 20:19

## 2025-09-04 RX ADMIN — METOPROLOL TARTRATE 50 MG: 50 TABLET, FILM COATED ORAL at 20:19

## 2025-09-04 RX ADMIN — APIXABAN 5 MG: 5 TABLET, FILM COATED ORAL at 20:19

## 2025-09-04 RX ADMIN — CEFTRIAXONE 2 G: 2 INJECTION, POWDER, FOR SOLUTION INTRAMUSCULAR; INTRAVENOUS at 12:36

## 2025-09-04 RX ADMIN — GADOBUTROL 6 ML: 604.72 INJECTION INTRAVENOUS at 08:57

## 2025-09-04 ASSESSMENT — ACTIVITIES OF DAILY LIVING (ADL)
ADLS_ACUITY_SCORE: 37